# Patient Record
Sex: MALE | Race: BLACK OR AFRICAN AMERICAN | Employment: UNEMPLOYED | URBAN - METROPOLITAN AREA
[De-identification: names, ages, dates, MRNs, and addresses within clinical notes are randomized per-mention and may not be internally consistent; named-entity substitution may affect disease eponyms.]

---

## 2017-03-06 RX ORDER — LACOSAMIDE 100 MG/1
TABLET, FILM COATED ORAL
Qty: 120 TAB | Refills: 0 | Status: SHIPPED | OUTPATIENT
Start: 2017-03-06 | End: 2017-04-10 | Stop reason: SDUPTHER

## 2017-04-10 RX ORDER — LACOSAMIDE 100 MG/1
TABLET ORAL
Qty: 120 TAB | Refills: 6 | Status: SHIPPED | OUTPATIENT
Start: 2017-04-10 | End: 2017-04-11 | Stop reason: SDUPTHER

## 2017-04-10 RX ORDER — LEVETIRACETAM 500 MG/1
500 TABLET ORAL 2 TIMES DAILY
Qty: 60 TAB | Refills: 6 | Status: SHIPPED | OUTPATIENT
Start: 2017-04-10 | End: 2018-05-06 | Stop reason: SDUPTHER

## 2017-04-10 RX ORDER — DIVALPROEX SODIUM 500 MG/1
TABLET, EXTENDED RELEASE ORAL
Qty: 60 TAB | Refills: 6 | Status: SHIPPED | OUTPATIENT
Start: 2017-04-10 | End: 2017-11-03 | Stop reason: SDUPTHER

## 2017-04-11 ENCOUNTER — TELEPHONE (OUTPATIENT)
Dept: NEUROLOGY | Age: 32
End: 2017-04-11

## 2017-04-11 NOTE — TELEPHONE ENCOUNTER
----- Message from Luiza Zazueta sent at 4/11/2017 11:59 AM EDT -----  Regarding: Dr. Rios Arch P) [phone], pt's sister, was inquiring on the status of the Rx refill requested submitted last week.

## 2017-04-12 RX ORDER — LACOSAMIDE 100 MG/1
TABLET, FILM COATED ORAL
Qty: 120 TAB | Refills: 0 | Status: SHIPPED | OUTPATIENT
Start: 2017-04-12 | End: 2017-05-25 | Stop reason: SDUPTHER

## 2017-05-25 ENCOUNTER — OFFICE VISIT (OUTPATIENT)
Dept: NEUROLOGY | Age: 32
End: 2017-05-25

## 2017-05-25 VITALS
HEIGHT: 60 IN | SYSTOLIC BLOOD PRESSURE: 118 MMHG | BODY MASS INDEX: 25.91 KG/M2 | WEIGHT: 132 LBS | DIASTOLIC BLOOD PRESSURE: 70 MMHG | RESPIRATION RATE: 20 BRPM

## 2017-05-25 DIAGNOSIS — G40.109 PARTIAL EPILEPSY (HCC): Primary | ICD-10-CM

## 2017-05-25 RX ORDER — LACOSAMIDE 100 MG/1
TABLET ORAL
Qty: 120 TAB | Refills: 5 | Status: SHIPPED | OUTPATIENT
Start: 2017-05-25 | End: 2017-06-08 | Stop reason: SDUPTHER

## 2017-05-25 NOTE — PATIENT INSTRUCTIONS
[facility]   Statement to Patients  April 1, 2014      In an effort to ensure the large volume of patient prescription refills is processed in the most efficient and expeditious manner, we are asking our patients to assist us by calling your Pharmacy for all prescription refills, this will include also your  Mail Order Pharmacy. The pharmacy will contact our office electronically to continue the refill process. Please do not wait until the last minute to call your pharmacy. We need at least 48 hours (2days) to fill prescriptions. We also encourage you to call your pharmacy before going to  your prescription to make sure it is ready. With regard to controlled substance prescription refill requests (narcotic refills) that need to be picked up at our office, we ask your cooperation by providing us with at least 72 hours (3days) notice that you will need a refill. We will not refill narcotic prescription refill requests after 4:00pm on any weekday, Monday through Thursday, or after 2:00pm on Fridays, or on the weekends. We encourage everyone to explore another way of getting your prescription refill request processed using [employer] (formerly [employer]), our patient web portal through our electronic medical record system. [employer] (formerly [employer]) is an efficient and effective way to communicate your medication request directly to the office and  downloadable as an felicity on your smart phone . [employer] (formerly [employer]) also features a review functionality that allows you to view your medication list as well as leave messages for your physician. Are you ready to get connected? If so please review the attatched instructions or speak to any of our staff to get you set up right away! Thank you so much for your cooperation. Should you have any questions please contact our Practice Administrator.     The Physicians and Staff,  [facility] [id number] [address]  What is a living will?  A living will is a legal form you use to write down the kind of care you want at the end of your life. It is used by the health professionals who will treat you if you aren't able to decide for yourself. If you put your wishes in writing, your loved ones and others will know what kind of care you want. They won't need to guess. This can ease your mind and be helpful to others. A living will is not the same as an estate or property will. An estate will explains what you want to happen with your money and property after you die. Is a living will a legal document? A living will is a legal document. Each state has its own laws about living ribeiro. If you move to another state, make sure that your living will is legal in the state where you now live. Or you might use a universal form that has been approved by many states. This kind of form can sometimes be completed and stored online. Your electronic copy will then be available wherever you have a connection to the Internet. In most cases, doctors will respect your wishes even if you have a form from a different state. · You don't need an  to complete a living will. But legal advice can be helpful if your state's laws are unclear, your health history is complicated, or your family can't agree on what should be in your living will. · You can change your living will at any time. Some people find that their wishes about end-of-life care change as their health changes. · In addition to making a living will, think about completing a medical power of  form. This form lets you name the person you want to make end-of-life treatment decisions for you (your \"health care agent\") if you're not able to. Many hospitals and nursing homes will give you the forms you need to complete a living will and a medical power of . · Your living will is used only if you can't make or communicate decisions for yourself anymore.  If you become able to make decisions again, you can accept or refuse any treatment, no matter what you wrote in your living will. · Your state may offer an online registry. This is a place where you can store your living will online so the doctors and nurses who need to treat you can find it right away. What should you think about when creating a living will? Talk about your end-of-life wishes with your family members and your doctor. Let them know what you want. That way the people making decisions for you won't be surprised by your choices. Think about these questions as you make your living will:  · Do you know enough about life support methods that might be used? If not, talk to your doctor so you know what might be done if you can't breathe on your own, your heart stops, or you're unable to swallow. · What things would you still want to be able to do after you receive life-support methods? Would you want to be able to walk? To speak? To eat on your own? To live without the help of machines? · If you have a choice, where do you want to be cared for? In your home? At a hospital or nursing home? · Do you want certain Zoroastrianism practices performed if you become very ill? · If you have a choice at the end of your life, where would you prefer to die? At home? In a hospital or nursing home? Somewhere else? · Would you prefer to be buried or cremated? · Do you want your organs to be donated after you die? What should you do with your living will? · Make sure that your family members and your health care agent have copies of your living will. · Give your doctor a copy of your living will to keep in your medical record. If you have more than one doctor, make sure that each one has a copy. · You may want to put a copy of your living will where it can be easily found. Where can you learn more? Go to http://mayra-jonnie.info/. Enter S855 in the search box to learn more about \"Learning About Living Perkatt. \"  Current as of: February 24, 2016  Content Version: 11.2  © 0178-0558 [employer]. Care instructions adapted under license by [employer] (which disclaims liability or warranty for this information). If you have questions about a medical condition or this instruction, always ask your healthcare professional. [address] any warranty or liability for your use of this information. Advance Directives: Care Instructions  Your Care Instructions  An advance directive is a legal way to state your wishes at the end of your life. It tells your family and your doctor what to do if you can no longer say what you want. There are two main types of advance directives. You can change them any time that your wishes change. · A living will tells your family and your doctor your wishes about life support and other treatment. · A durable power of  for health care lets you name a person to make treatment decisions for you when you can't speak for yourself. This person is called a health care agent. If you do not have an advance directive, decisions about your medical care may be made by a doctor or a  who doesn't know you. It may help to think of an advance directive as a gift to the people who care for you. If you have one, they won't have to make tough decisions by themselves. Follow-up care is a key part of your treatment and safety. Be sure to make and go to all appointments, and call your doctor if you are having problems. It's also a good idea to know your test results and keep a list of the medicines you take. How can you care for yourself at home? · Discuss your wishes with your loved ones and your doctor. This way, there are no surprises. · Many states have a unique form. Or you might use a universal form that has been approved by many states. This kind of form can sometimes be completed and stored online.  Your electronic copy will then be available wherever you have a connection to the Internet. In most cases, doctors will respect your wishes even if you have a form from a different state. · You don't need a  to do an advance directive. But you may want to get legal advice. · Think about these questions when you prepare an advance directive:  ¨ Who do you want to make decisions about your medical care if you are not able to? Many people choose a family member or close friend. ¨ Do you know enough about life support methods that might be used? If not, talk to your doctor so you understand. ¨ What are you most afraid of that might happen? You might be afraid of having pain, losing your independence, or being kept alive by machines. ¨ Where would you prefer to die? Choices include your home, a hospital, or a nursing home. ¨ Would you like to have information about hospice care to support you and your family? ¨ Do you want to donate organs when you die? ¨ Do you want certain Hinduism practices performed before you die? If so, put your wishes in the advance directive. · Read your advance directive every year, and make changes as needed. When should you call for help? Be sure to contact your doctor if you have any questions. Where can you learn more? Go to http://mayra-jonnie.info/. Enter R264 in the search box to learn more about \"Advance Directives: Care Instructions. \"  Current as of: November 17, 2016  Content Version: 11.2  © 8924-0767 [employer]. Care instructions adapted under license by [employer] (which disclaims liability or warranty for this information). If you have questions about a medical condition or this instruction, always ask your healthcare professional. [address] any warranty or liability for your use of this information.

## 2017-05-25 NOTE — PROGRESS NOTES
Xavier Sanchez is a 27 y.o. male who presents with the following  Chief Complaint   Patient presents with    Seizure       HPI Patient comes in for a follow up for seizures. He is currently on Vimpat 200 mg BID, Depakote ER 1000 mg QHS, Keppra 500 mg BID. He has not noticed any seizures or seizure activity. Not gotten labs in a while. He feels like things have been going well. Living with his girlfriend. not noticed any seizure activity. Not a very good historian. Allergies   Allergen Reactions    Penicillins Rash       Current Outpatient Prescriptions   Medication Sig    lacosamide (VIMPAT) 100 mg tab tablet take 2 tablets by mouth twice a day    divalproex ER (DEPAKOTE ER) 500 mg ER tablet take 2 tablets by mouth at bedtime    levETIRAcetam (KEPPRA) 500 mg tablet Take 1 Tab by mouth two (2) times a day. Indications: MYOCLONIC EPILEPSY ADJUNCT TREATMENT     No current facility-administered medications for this visit. History   Smoking Status    Current Every Day Smoker    Packs/day: 1.00    Years: 10.00   Smokeless Tobacco    Never Used       Past Medical History:   Diagnosis Date    Neurological disorder     Seizures ([facility] [address]        No past surgical history on file. No family history on file. Social History     Social History    Marital status: SINGLE     Spouse name: N/A    Number of children: N/A    Years of education: N/A     Social History Main Topics    Smoking status: Current Every Day Smoker     Packs/day: 1.00     Years: 10.00    Smokeless tobacco: Never Used    Alcohol use Yes    Drug use: No    Sexual activity: Yes     Other Topics Concern    Not on file     Social History Narrative       Review of Systems   HENT: Negative for hearing loss and tinnitus. Eyes: Negative for blurred vision, double vision and photophobia. Gastrointestinal: Negative for nausea and vomiting.    Neurological: Negative for dizziness, tingling, tremors, seizures, loss of consciousness, weakness and headaches. Remainder of comprehensive review is negative. Physical Exam :    Visit Vitals    /70    Resp 20    Ht 5' (1.524 m)    Wt 59.9 kg (132 lb)    BMI 25.78 kg/m2       General: Well defined, nourished, and groomed individual in no acute distress.    Neck: Supple, nontender, no bruits, no pain with resistance to active range of motion.    Heart: Regular rate and rhythm, no murmurs, rub, or gallop. Normal S1S2. Lungs: Clear to auscultation bilaterally with equal chest expansion, no cough, no wheeze  Musculoskeletal: Extremities revealed no edema and had full range of motion of joints.    Psych: Good mood and bright affect    NEUROLOGICAL EXAMINATION:    Mental Status: Alert and oriented to person, place, and time    Cranial Nerves:    II, III, IV, VI: Visual acuity grossly intact. Visual fields are normal.    Pupils are equal, round, and reactive to light and accommodation.    Extra-ocular movements are full and fluid. Fundoscopic exam was benign, no ptosis or nystagmus.    V-XII: Hearing is grossly intact. Facial features are symmetric, with normal sensation and strength. The palate rises symmetrically and the tongue protrudes midline. Sternocleidomastoids 5/5. Motor Examination: Normal tone, bulk, and strength, 5/5 muscle strength throughout. Coordination: Finger to nose was normal. No resting or intention tremor    Gait and Station: Steady while walking. Normal arm swing. No pronator drift. No muscle wasting or fasiculations noted. Reflexes: DTRs 2+ throughout.             Results for orders placed or performed in visit on 04/09/15   VALPROIC ACID   Result Value Ref Range    Valproic acid 106 (H) 50 - 100 ug/mL   LACOSAMIDE   Result Value Ref Range    LACOSAMIDE 3.1 (L) 5.0 - 10.0 ug/mL   LEVETIRACETAM (KEPPRA)   Result Value Ref Range    LEVETIRACETAM, S 6.6 5.0 - 63.0 ug/mL       Orders Placed This Encounter    LACOSAMIDE    LEVETIRACETAM (KEPPRA)    VALPROIC ACID, FREE    HEPATIC FUNCTION PANEL    METABOLIC PANEL, COMPREHENSIVE    lacosamide (VIMPAT) 100 mg tab tablet     Sig: take 2 tablets by mouth twice a day     Dispense:  120 Tab     Refill:  5       1. Partial epilepsy ([address]        Follow-up Disposition:  Return in about 6 months (around 11/25/2017). Partial epilepsy seems to be stable on Vimpat, Depakote, and Keppra. Continue current doses. We will want to get blood levels of all these and also evaluate liver and kidney functioning. Call with any issues.          This note will not be viewable in [facility]

## 2017-05-25 NOTE — MR AVS SNAPSHOT
Visit Information Date & Time Provider Department Dept. Phone Encounter #  
 5/25/2017  3:00 PM Mahi Michael, [place] [address] [facility] [phone] [id number] Follow-up Instructions Return in about 6 months (around 11/25/2017). Upcoming Health Maintenance Date Due Pneumococcal 19-64 Medium Risk (1 of 1 - PPSV23) 12/19/2003 DTaP/Tdap/Td series (1 - Tdap) 12/19/2005 INFLUENZA AGE 9 TO ADULT 8/1/2017 Allergies as of 5/25/2017  Review Complete On: 5/25/2017 By: Cynthia Wilson LPN Severity Noted Reaction Type Reactions Penicillins Low 08/01/2010   Topical Rash Current Immunizations  Never Reviewed No immunizations on file. Not reviewed this visit You Were Diagnosed With   
  
 Codes Comments Partial epilepsy ([facility]    -  Primary ICD-10-CM: G40.109 ICD-9-CM: 345.50 Vitals BP Resp Height(growth percentile) Weight(growth percentile) BMI Smoking Status 118/70 20 5' (1.524 m) 132 lb (59.9 kg) 25.78 kg/m2 Current Every Day Smoker BMI and BSA Data Body Mass Index Body Surface Area 25.78 kg/m 2 1.59 m 2 Preferred Pharmacy Pharmacy Name Phone [id number] [address] [phone] Your Updated Medication List  
  
   
This list is accurate as of: 5/25/17  3:26 PM.  Always use your most recent med list.  
  
  
  
  
 divalproex  mg ER tablet Commonly known as:  DEPAKOTE ER  
take 2 tablets by mouth at bedtime  
  
 lacosamide 100 mg Tab tablet Commonly known as:  VIMPAT  
take 2 tablets by mouth twice a day  
  
 levETIRAcetam 500 mg tablet Commonly known as:  KEPPRA Take 1 Tab by mouth two (2) times a day. Indications: MYOCLONIC EPILEPSY ADJUNCT TREATMENT Prescriptions Printed Refills  
 lacosamide (VIMPAT) 100 mg tab tablet 5 Sig: take 2 tablets by mouth twice a day Class: Print We Performed the Following HEPATIC FUNCTION PANEL [99985 CPT(R)] LACOSAMIDE [94110 CPT(R)] LEVETIRACETAM (KEPPRA) [id number] CPT(R)] METABOLIC PANEL, COMPREHENSIVE [31643 CPT(R)] VALPROIC ACID, FREE [93141 CPT(R)] Follow-up Instructions Return in about 6 months (around 11/25/2017). Patient Instructions PRESCRIPTION REFILL POLICY [facility] Statement to Patients April 1, 2014 In an effort to ensure the large volume of patient prescription refills is processed in the most efficient and expeditious manner, we are asking our patients to assist us by calling your Pharmacy for all prescription refills, this will include also your  Mail Order Pharmacy. The pharmacy will contact our office electronically to continue the refill process. Please do not wait until the last minute to call your pharmacy. We need at least 48 hours (2days) to fill prescriptions. We also encourage you to call your pharmacy before going to  your prescription to make sure it is ready. With regard to controlled substance prescription refill requests (narcotic refills) that need to be picked up at our office, we ask your cooperation by providing us with at least 72 hours (3days) notice that you will need a refill. We will not refill narcotic prescription refill requests after 4:00pm on any weekday, Monday through Thursday, or after 2:00pm on Fridays, or on the weekends. We encourage everyone to explore another way of getting your prescription refill request processed using CodeEval, our patient web portal through our electronic medical record system. CodeEval is an efficient and effective way to communicate your medication request directly to the office and  downloadable as an felicity on your smart phone .  CodeEval also features a review functionality that allows you to view your medication list as well as leave messages for your physician. Are you ready to get connected? If so please review the attatched instructions or speak to any of our staff to get you set up right away! Thank you so much for your cooperation. Should you have any questions please contact our Practice Administrator. The Physicians and Staff,  [facility] [address]igi [address] What is a living will? A living will is a legal form you use to write down the kind of care you want at the end of your life. It is used by the health professionals who will treat you if you aren't able to decide for yourself. If you put your wishes in writing, your loved ones and others will know what kind of care you want. They won't need to guess. This can ease your mind and be helpful to others. A living will is not the same as an estate or property will. An estate will explains what you want to happen with your money and property after you die. Is a living will a legal document? A living will is a legal document. Each state has its own laws about living ribeiro. If you move to another state, make sure that your living will is legal in the state where you now live. Or you might use a universal form that has been approved by many states. This kind of form can sometimes be completed and stored online. Your electronic copy will then be available wherever you have a connection to the Internet. In most cases, doctors will respect your wishes even if you have a form from a different state. · You don't need an  to complete a living will. But legal advice can be helpful if your state's laws are unclear, your health history is complicated, or your family can't agree on what should be in your living will. · You can change your living will at any time. Some people find that their wishes about end-of-life care change as their health changes.  
· In addition to making a living will, think about completing a medical power of  form. This form lets you name the person you want to make end-of-life treatment decisions for you (your \"health care agent\") if you're not able to. Many hospitals and nursing homes will give you the forms you need to complete a living will and a medical power of . · Your living will is used only if you can't make or communicate decisions for yourself anymore. If you become able to make decisions again, you can accept or refuse any treatment, no matter what you wrote in your living will. · Your state may offer an online registry. This is a place where you can store your living will online so the doctors and nurses who need to treat you can find it right away. What should you think about when creating a living will? Talk about your end-of-life wishes with your family members and your doctor. Let them know what you want. That way the people making decisions for you won't be surprised by your choices. Think about these questions as you make your living will: · Do you know enough about life support methods that might be used? If not, talk to your doctor so you know what might be done if you can't breathe on your own, your heart stops, or you're unable to swallow. · What things would you still want to be able to do after you receive life-support methods? Would you want to be able to walk? To speak? To eat on your own? To live without the help of machines? · If you have a choice, where do you want to be cared for? In your home? At a hospital or nursing home? · Do you want certain Caodaism practices performed if you become very ill? · If you have a choice at the end of your life, where would you prefer to die? At home? In a hospital or nursing home? Somewhere else? · Would you prefer to be buried or cremated? · Do you want your organs to be donated after you die? What should you do with your living will?  
· Make sure that your family members and your health care agent have copies of your living will. · Give your doctor a copy of your living will to keep in your medical record. If you have more than one doctor, make sure that each one has a copy. · You may want to put a copy of your living will where it can be easily found. Where can you learn more? Go to http://mayra-jonnie.info/. Enter D472 in the search box to learn more about \"Learning About Living Lisa. \" Current as of: February 24, 2016 Content Version: 11.2 © 0651-1335 [employer]. Care instructions adapted under license by [employer] (which disclaims liability or warranty for this information). If you have questions about a medical condition or this instruction, always ask your healthcare professional. [address] any warranty or liability for your use of this information. Advance Directives: Care Instructions Your Care Instructions An advance directive is a legal way to state your wishes at the end of your life. It tells your family and your doctor what to do if you can no longer say what you want. There are two main types of advance directives. You can change them any time that your wishes change. · A living will tells your family and your doctor your wishes about life support and other treatment. · A durable power of  for health care lets you name a person to make treatment decisions for you when you can't speak for yourself. This person is called a health care agent. If you do not have an advance directive, decisions about your medical care may be made by a doctor or a  who doesn't know you. It may help to think of an advance directive as a gift to the people who care for you. If you have one, they won't have to make tough decisions by themselves. Follow-up care is a key part of your treatment and safety.  Be sure to make and go to all appointments, and call your doctor if you are having problems. It's also a good idea to know your test results and keep a list of the medicines you take. How can you care for yourself at home? · Discuss your wishes with your loved ones and your doctor. This way, there are no surprises. · Many states have a unique form. Or you might use a universal form that has been approved by many states. This kind of form can sometimes be completed and stored online. Your electronic copy will then be available wherever you have a connection to the Internet. In most cases, doctors will respect your wishes even if you have a form from a different state. · You don't need a  to do an advance directive. But you may want to get legal advice. · Think about these questions when you prepare an advance directive: ¨ Who do you want to make decisions about your medical care if you are not able to? Many people choose a family member or close friend. ¨ Do you know enough about life support methods that might be used? If not, talk to your doctor so you understand. ¨ What are you most afraid of that might happen? You might be afraid of having pain, losing your independence, or being kept alive by machines. ¨ Where would you prefer to die? Choices include your home, a hospital, or a nursing home. ¨ Would you like to have information about hospice care to support you and your family? ¨ Do you want to donate organs when you die? ¨ Do you want certain Yazidism practices performed before you die? If so, put your wishes in the advance directive. · Read your advance directive every year, and make changes as needed. When should you call for help? Be sure to contact your doctor if you have any questions. Where can you learn more? Go to http://mayra-jonnie.info/. Enter R264 in the search box to learn more about \"Advance Directives: Care Instructions. \" Current as of: November 17, 2016 Content Version: 11.2 © 4424-9701 [employer]. Care instructions adapted under license by [employer] (which disclaims liability or warranty for this information). If you have questions about a medical condition or this instruction, always ask your healthcare professional. [address]  any warranty or liability for your use of this information. Introducing [facility]! Deion Allan introduces Adknowledge patient portal. Now you can access parts of your medical record, email your doctor's office, and request medication refills online. 1. In your internet browser, go to [email or web address] 2. Click on the First Time User? Click Here link in the Sign In box. You will see the New Member Sign Up page. 3. Enter your Adknowledge Access Code exactly as it appears below. You will not need to use this code after youve completed the sign-up process. If you do not sign up before the expiration date, you must request a new code. · Adknowledge Access Code: [email or web address] Expires: 8/23/2017  3:26 PM 
 
4. Enter the last four digits of your Social Security Number (xxxx) and Date of Birth (mm/dd/yyyy) as indicated and click Submit. You will be taken to the next sign-up page. 5. Create a Adknowledge ID. This will be your Adknowledge login ID and cannot be changed, so think of one that is secure and easy to remember. 6. Create a Adknowledge password. You can change your password at any time. 7. Enter your Password Reset Question and Answer. This can be used at a later time if you forget your password. 8. Enter your e-mail address. You will receive e-mail notification when new information is available in [address] 9. Click Sign Up. You can now view and download portions of your medical record. 10. Click the Download Summary menu link to download a portable copy of your medical information.  
 
If you have questions, please visit the Frequently Asked Questions section of the [employer]. Remember, [employer] is NOT to be used for urgent needs. For medical emergencies, dial 911. Now available from your iPhone and Android! Please provide this summary of care documentation to your next provider. Your primary care clinician is listed as NILESH FREY. If you have any questions after today's visit, please call [phone].

## 2017-06-13 ENCOUNTER — ED HISTORICAL/CONVERTED ENCOUNTER (OUTPATIENT)
Dept: OTHER | Age: 32
End: 2017-06-13

## 2017-11-03 RX ORDER — DIVALPROEX SODIUM 500 MG/1
TABLET, EXTENDED RELEASE ORAL
Qty: 60 TAB | Refills: 6 | Status: SHIPPED | OUTPATIENT
Start: 2017-11-03 | End: 2019-04-18

## 2018-02-26 ENCOUNTER — APPOINTMENT (OUTPATIENT)
Dept: CT IMAGING | Age: 33
End: 2018-02-26
Attending: EMERGENCY MEDICINE
Payer: MEDICARE

## 2018-02-26 ENCOUNTER — HOSPITAL ENCOUNTER (EMERGENCY)
Age: 33
Discharge: HOME | End: 2018-02-26
Attending: EMERGENCY MEDICINE
Payer: MEDICARE

## 2018-02-26 VITALS
BODY MASS INDEX: 25 KG/M2 | RESPIRATION RATE: 16 BRPM | HEART RATE: 91 BPM | TEMPERATURE: 98.2 F | SYSTOLIC BLOOD PRESSURE: 125 MMHG | DIASTOLIC BLOOD PRESSURE: 56 MMHG | OXYGEN SATURATION: 98 % | WEIGHT: 128 LBS

## 2018-02-26 DIAGNOSIS — G40.909 NONINTRACTABLE EPILEPSY WITHOUT STATUS EPILEPTICUS, UNSPECIFIED EPILEPSY TYPE (HCC): Primary | ICD-10-CM

## 2018-02-26 LAB
BASOPHILS # BLD: 0 K/UL (ref 0–0.1)
BASOPHILS NFR BLD: 1 % (ref 0–1)
DIFFERENTIAL METHOD BLD: ABNORMAL
EOSINOPHIL # BLD: 0.2 K/UL (ref 0–0.4)
EOSINOPHIL NFR BLD: 5 % (ref 0–7)
ERYTHROCYTE [DISTWIDTH] IN BLOOD BY AUTOMATED COUNT: 12.5 % (ref 11.5–14.5)
HCT VFR BLD AUTO: 41.2 % (ref 36.6–50.3)
HGB BLD-MCNC: 14.4 G/DL (ref 12.1–17)
IMM GRANULOCYTES # BLD: 0 K/UL (ref 0–0.04)
IMM GRANULOCYTES NFR BLD AUTO: 0 % (ref 0–0.5)
LYMPHOCYTES # BLD: 1.7 K/UL (ref 0.8–3.5)
LYMPHOCYTES NFR BLD: 34 % (ref 12–49)
MAGNESIUM SERPL-MCNC: 1.7 MG/DL (ref 1.6–2.4)
MCH RBC QN AUTO: 30.9 PG (ref 26–34)
MCHC RBC AUTO-ENTMCNC: 35 G/DL (ref 30–36.5)
MCV RBC AUTO: 88.4 FL (ref 80–99)
MONOCYTES # BLD: 0.8 K/UL (ref 0–1)
MONOCYTES NFR BLD: 16 % (ref 5–13)
NEUTS SEG # BLD: 2.2 K/UL (ref 1.8–8)
NEUTS SEG NFR BLD: 45 % (ref 32–75)
NRBC # BLD: 0 K/UL (ref 0–0.01)
NRBC BLD-RTO: 0 PER 100 WBC
PLATELET # BLD AUTO: 151 K/UL (ref 150–400)
PMV BLD AUTO: 11.5 FL (ref 8.9–12.9)
RBC # BLD AUTO: 4.66 M/UL (ref 4.1–5.7)
VALPROATE SERPL-MCNC: 67 UG/ML (ref 50–100)
WBC # BLD AUTO: 4.8 K/UL (ref 4.1–11.1)

## 2018-02-26 PROCEDURE — 99284 EMERGENCY DEPT VISIT MOD MDM: CPT

## 2018-02-26 PROCEDURE — 80164 ASSAY DIPROPYLACETIC ACD TOT: CPT | Performed by: EMERGENCY MEDICINE

## 2018-02-26 PROCEDURE — 85025 COMPLETE CBC W/AUTO DIFF WBC: CPT | Performed by: EMERGENCY MEDICINE

## 2018-02-26 PROCEDURE — 80177 DRUG SCRN QUAN LEVETIRACETAM: CPT | Performed by: EMERGENCY MEDICINE

## 2018-02-26 PROCEDURE — 36415 COLL VENOUS BLD VENIPUNCTURE: CPT | Performed by: EMERGENCY MEDICINE

## 2018-02-26 PROCEDURE — 70450 CT HEAD/BRAIN W/O DYE: CPT

## 2018-02-26 PROCEDURE — 83735 ASSAY OF MAGNESIUM: CPT | Performed by: EMERGENCY MEDICINE

## 2018-02-27 NOTE — ED NOTES
Patient presented to the ED today for complaints of seizures. Patient says symptoms started tonight. Patient says a family member witnessed him having a seizure and falling. Patient does not remember having the seizure. Respirations are even and unlabored.

## 2018-02-27 NOTE — ED NOTES
Patient educated on discharge instructions  . Patient verbalized understanding of eduction. Patient given discharge instructions . Patient ambulated out of ED with his mother and sister. No acute distress noted. Emergency Department Nursing Plan of Care       The Nursing Plan of Care is developed from the Nursing assessment and Emergency Department Attending provider initial evaluation. The plan of care may be reviewed in the ED Provider note.     The Plan of Care was developed with the following considerations:   Patient / Family readiness to learn indicated by:verbalized understanding  Persons(s) to be included in education: patient  Barriers to Learning/Limitations:No    Signed     Jacob Alvarez RN    2/26/2018   11:41 PM

## 2018-02-27 NOTE — ED PROVIDER NOTES
EMERGENCY DEPARTMENT HISTORY AND PHYSICAL EXAM      Date: 2/26/2018  Patient Name: Tammy Boykin    History of Presenting Illness     Chief Complaint   Patient presents with    Seizure     Patient says he had a seizure tonight. Patient says he fell during the seizure. History Provided By: Patient and EMS    HPI: Tammy Boykin, 34 y.o. male with PMHx significant for seizures who presents via EMS to the ED s/p seizures that occurred PTA. Pt reports associated head injury that occurred during his seizure. Pt reports that he is compliant with his rx for Depakote and Keppra. He denies any recent EtOH use. Pt specifically denies any confusion, HA, nausea, vomiting, CP, SOB, vision changes, fevers, or chills. + tobacco use (1 ppd), + EtOH use, - Illicit drug use    PCP: Dain Cunningham MD    There are no other complaints, changes, or physical findings at this time. Current Outpatient Prescriptions   Medication Sig Dispense Refill    VIMPAT 100 mg tab tablet take 2 tablets by mouth twice a day 120 Tab 0    divalproex ER (DEPAKOTE ER) 500 mg ER tablet take 2 tablets by mouth at bedtime 60 Tab 6    levETIRAcetam (KEPPRA) 500 mg tablet Take 1 Tab by mouth two (2) times a day. Indications: MYOCLONIC EPILEPSY ADJUNCT TREATMENT 60 Tab 6       Past History     Past Medical History:  Past Medical History:   Diagnosis Date    Neurological disorder     Seizures ([address]        Past Surgical History:  History reviewed. No pertinent surgical history. Family History:  History reviewed. No pertinent family history. Social History:  Social History   Substance Use Topics    Smoking status: Current Every Day Smoker     Packs/day: 1.00     Years: 10.00    Smokeless tobacco: Never Used    Alcohol use Yes       Allergies: Allergies   Allergen Reactions    Penicillins Rash         Review of Systems   Review of Systems   Constitutional: Negative for chills and fever.    HENT: Negative for congestion, rhinorrhea, sneezing and sore throat. Eyes: Negative for redness and visual disturbance. Respiratory: Negative for shortness of breath. Cardiovascular: Negative for chest pain and leg swelling. Gastrointestinal: Negative for abdominal pain, nausea and vomiting. Genitourinary: Negative for difficulty urinating and frequency. Musculoskeletal: Negative for back pain, myalgias and neck stiffness. Skin: Negative for rash. Neurological: Positive for seizures. Negative for dizziness, syncope, weakness and headaches.        + head injury   Hematological: Negative for adenopathy. Psychiatric/Behavioral: Negative for confusion. All other systems reviewed and are negative. Physical Exam   Physical Exam   Constitutional: He is oriented to person, place, and time. He appears well-developed and well-nourished. No distress. NAD. Cooperative. HENT:   Head: Normocephalic and atraumatic. Mouth/Throat: Oropharynx is clear and moist. No oropharyngeal exudate or posterior oropharyngeal erythema. Neck: Normal range of motion and full passive range of motion without pain. Neck supple. Cardiovascular: Normal rate, regular rhythm, normal heart sounds, intact distal pulses and normal pulses. Exam reveals no gallop and no friction rub. No murmur heard. Pulmonary/Chest: Effort normal and breath sounds normal. No accessory muscle usage. No respiratory distress. He has no decreased breath sounds. He has no wheezes. He has no rhonchi. He has no rales. Abdominal: Soft. Bowel sounds are normal. He exhibits no distension. There is no tenderness. There is no rebound, no guarding and no CVA tenderness. Musculoskeletal: Normal range of motion. He exhibits no edema or tenderness. Thoracic back: He exhibits no tenderness and no bony tenderness. Lumbar back: He exhibits no tenderness and no bony tenderness. Lymphadenopathy:     He has no cervical adenopathy.    Neurological: He is alert and oriented to person, place, and time. He has normal strength. He is not disoriented. No cranial nerve deficit or sensory deficit. No focal deficits; 5/5 muscle strength in all extremities. ANO x 3. After post-ictal period. Skin: Skin is warm. No lesion and no rash noted. Rash is not nodular. He is not diaphoretic. Cap refill < 2 seconds. Contusion with skin breakdown to the left occipital scalp. No other lesions. Nursing note and vitals reviewed. Diagnostic Study Results     Labs -     Recent Results (from the past 12 hour(s))   VALPROIC ACID    Collection Time: 02/26/18 10:34 PM   Result Value Ref Range    Valproic acid 67 50 - 100 ug/ml   CBC WITH AUTOMATED DIFF    Collection Time: 02/26/18 10:34 PM   Result Value Ref Range    WBC 4.8 4.1 - 11.1 K/uL    RBC 4.66 4.10 - 5.70 M/uL    HGB 14.4 12.1 - 17.0 g/dL    HCT 41.2 36.6 - 50.3 %    MCV 88.4 80.0 - 99.0 FL    MCH 30.9 26.0 - 34.0 PG    MCHC 35.0 30.0 - 36.5 g/dL    RDW 12.5 11.5 - 14.5 %    PLATELET 460 149 - 228 K/uL    MPV 11.5 8.9 - 12.9 FL    NRBC 0.0 0  WBC    ABSOLUTE NRBC 0.00 0.00 - 0.01 K/uL    NEUTROPHILS 45 32 - 75 %    LYMPHOCYTES 34 12 - 49 %    MONOCYTES 16 (H) 5 - 13 %    EOSINOPHILS 5 0 - 7 %    BASOPHILS 1 0 - 1 %    IMMATURE GRANULOCYTES 0 0.0 - 0.5 %    ABS. NEUTROPHILS 2.2 1.8 - 8.0 K/UL    ABS. LYMPHOCYTES 1.7 0.8 - 3.5 K/UL    ABS. MONOCYTES 0.8 0.0 - 1.0 K/UL    ABS. EOSINOPHILS 0.2 0.0 - 0.4 K/UL    ABS. BASOPHILS 0.0 0.0 - 0.1 K/UL    ABS. IMM. GRANS. 0.0 0.00 - 0.04 K/UL    DF AUTOMATED     MAGNESIUM    Collection Time: 02/26/18 10:34 PM   Result Value Ref Range    Magnesium 1.7 1.6 - 2.4 mg/dL       Medical Decision Making   I am the first provider for this patient. I reviewed the vital signs, available nursing notes, past medical history, past surgical history, family history and social history. Vital Signs-Reviewed the patient's vital signs.   Patient Vitals for the past 12 hrs:   Temp Pulse Resp BP SpO2   02/26/18 2209 98.2 °F (36.8 °C) 91 16 125/56 98 %       Records Reviewed: Nursing Notes and Old Medical Records    Provider Notes (Medical Decision Making):     Breakthrough epilepsy with medication compliance. ED Course:   Initial assessment performed. The patients presenting problems have been discussed, and they are in agreement with the care plan formulated and outlined with them. I have encouraged them to ask questions as they arise throughout their visit. PROGRESS NOTE:  11:31 PM  Pt updated on CT and lab results. Discussed with the pt that the results of his keppra levels will not be back today and the need to follow up with neurology. Written by Cara Yoo ED scribe, as dictated by Miryam Torres MD    Disposition:    DISCHARGE NOTE  11:31 PM  The patient has been re-evaluated and is ready for discharge. Reviewed available results with patient. Counseled patient on diagnosis and care plan. Patient has expressed understanding, and all questions have been answered. Patient agrees with plan and agrees to follow up as recommended, or return to the ED if their symptoms worsen. Discharge instructions have been provided and explained to the patient, along with reasons to return to the ED. PLAN:  1. Current Discharge Medication List        2. Follow-up Information     Follow up With Details Comments Contact Info    Dain Cunningham MD   Patient can only remember the practice name and not the physician          Return to ED if worse     Diagnosis     Clinical Impression:   1. Nonintractable epilepsy without status epilepticus, unspecified epilepsy type [facility])        Attestations: This note is prepared by Cara Yoo, acting as Scribe for Miryam Torres MD.    Miryam Torres MD: The scribe's documentation has been prepared under my direction and personally reviewed by me in its entirety.  I confirm that the note above accurately reflects all work, treatment, procedures, and medical decision making performed by me.

## 2018-02-28 LAB — LEVETIRACETAM SERPL-MCNC: 3.4 UG/ML (ref 10–40)

## 2018-06-26 ENCOUNTER — TELEPHONE (OUTPATIENT)
Dept: NEUROLOGY | Age: 33
End: 2018-06-26

## 2018-06-26 NOTE — TELEPHONE ENCOUNTER
----- Message from Familia Morrison sent at 6/26/2018  1:59 PM EDT -----  Regarding: Dr. Dennis Pan  The patient is requesting a call back from the doctor to discuss his insurance coverage.  (u)[phone]

## 2018-07-09 ENCOUNTER — OFFICE VISIT (OUTPATIENT)
Dept: NEUROLOGY | Age: 33
End: 2018-07-09

## 2018-07-09 VITALS
RESPIRATION RATE: 98 BRPM | SYSTOLIC BLOOD PRESSURE: 122 MMHG | DIASTOLIC BLOOD PRESSURE: 86 MMHG | BODY MASS INDEX: 27.09 KG/M2 | HEART RATE: 69 BPM | HEIGHT: 60 IN | WEIGHT: 138 LBS

## 2018-07-09 DIAGNOSIS — G40.209 LOCALIZATION-RELATED (FOCAL) (PARTIAL) SYMPTOMATIC EPILEPSY AND EPILEPTIC SYNDROMES WITH COMPLEX PARTIAL SEIZURES, NOT INTRACTABLE, WITHOUT STATUS EPILEPTICUS (HCC): Primary | ICD-10-CM

## 2018-07-09 RX ORDER — LEVETIRACETAM 500 MG/1
1000 TABLET ORAL 2 TIMES DAILY
Qty: 120 TAB | Refills: 3 | Status: SHIPPED | OUTPATIENT
Start: 2018-07-09 | End: 2018-11-28 | Stop reason: SDUPTHER

## 2018-07-09 NOTE — PROGRESS NOTES
Follow up for seizure. He is accompanied by his nephew that lives with him. He says he cant remember when his last sz was but nephew thinks it was around June 16. He is currently only taking keppra 500 mg BID. They think he ran out of vimpat and divalproex in May.

## 2018-07-09 NOTE — MR AVS SNAPSHOT
[address] [facility] 
 
 
 [employer] [address] [id number] [phone] Patient: Sweta Leo MRN: [id number] :1985 Visit Information Date & Time Provider Department Dept. Phone Encounter #  
 2018  1:00 PM Marlen Alvarez NP [facility] Neurology [facility] [phone] [id number] Follow-up Instructions Return in about 8 weeks (around 9/3/2018). Your Appointments 2018 10:00 AM  
PROCEDURE with EEG SCHEDULE  [facility] ([facility]) Appt Note: [address] [facility],[address]  
 [facility] [address] [address] [phone]  
  
   
 [facility] [address] [id number] [address] Upcoming Health Maintenance Date Due Pneumococcal 19-64 Medium Risk (1 of 1 - PPSV23) 2003 DTaP/Tdap/Td series (1 - Tdap) 2005 Influenza Age 5 to Adult 2018 Allergies as of 2018  Review Complete On: 2018 By: Marlen Alvarez NP Severity Noted Reaction Type Reactions Penicillins Low 2010   Topical Rash Current Immunizations  Never Reviewed No immunizations on file. Not reviewed this visit You Were Diagnosed With   
  
 Codes Comments Localization-related (focal) (partial) symptomatic epilepsy and epileptic syndromes with complex partial seizures, not intractable, without status epilepticus ([facility]    -  Primary ICD-10-CM: V24.277 ICD-9-CM: 345.40 Vitals BP Pulse Resp Height(growth percentile) Weight(growth percentile) BMI  
 122/86 69 (!) 98 5' (1.524 m) 138 lb (62.6 kg) 26.95 kg/m2 Smoking Status Current Every Day Smoker BMI and BSA Data Body Mass Index Body Surface Area  
 26.95 kg/m 2 1.63 m 2 Preferred Pharmacy Pharmacy Name Phone [id number] & [address] [phone] Your Updated Medication List  
  
   
This list is accurate as of 7/9/18  2:17 PM.  Always use your most recent med list.  
  
  
  
  
 divalproex  mg ER tablet Commonly known as:  DEPAKOTE ER  
take 2 tablets by mouth at bedtime  
  
 levETIRAcetam 500 mg tablet Commonly known as:  KEPPRA Take 2 Tabs by mouth two (2) times a day. VIMPAT 100 mg Tab tablet Generic drug:  lacosamide  
take 2 tablets by mouth twice a day Prescriptions Sent to Pharmacy Refills  
 levETIRAcetam (KEPPRA) 500 mg tablet 3 Sig: Take 2 Tabs by mouth two (2) times a day. Class: Normal  
 Pharmacy: [facility] Jessica Smith, [facility] [facility] Ph #: [phone] Route: Oral  
  
Follow-up Instructions Return in about 8 weeks (around 9/3/2018). To-Do List   
 07/09/2018 Neurology:  EEG AMB NEURO   
  
 07/09/2018 Imaging:  MRI BRAIN W WO CONT Patient Instructions Increase Keppra to 1000mg twice a day. This would be two tablets twice a day. Introducing [facility]! [facility] introduces MaidSafe patient portal. Now you can access parts of your medical record, email your doctor's office, and request medication refills online. 1. In your internet browser, go to [email or web address] 2. Click on the First Time User? Click Here link in the Sign In box. You will see the New Member Sign Up page. 3. Enter your MaidSafe Access Code exactly as it appears below. You will not need to use this code after youve completed the sign-up process. If you do not sign up before the expiration date, you must request a new code. · MaidSafe Access Code: [email or web address] Expires: 10/7/2018  2:11 PM 
 
4. Enter the last four digits of your Social Security Number (xxxx) and Date of Birth (mm/dd/yyyy) as indicated and click Submit. You will be taken to the next sign-up page. 5. Create a TRAFI ID. This will be your TRAFI login ID and cannot be changed, so think of one that is secure and easy to remember. 6. Create a TRAFI password. You can change your password at any time. 7. Enter your Password Reset Question and Answer. This can be used at a later time if you forget your password. 8. Enter your e-mail address. You will receive e-mail notification when new information is available in [address] 9. Click Sign Up. You can now view and download portions of your medical record. 10. Click the Download Summary menu link to download a portable copy of your medical information. If you have questions, please visit the Frequently Asked Questions section of the TRAFI website. Remember, TRAFI is NOT to be used for urgent needs. For medical emergencies, dial 911. Now available from your iPhone and Android! Please provide this summary of care documentation to your next provider. Your primary care clinician is listed as Phys Other. If you have any questions after today's visit, please call [phone].

## 2018-07-09 NOTE — PROGRESS NOTES
Date:  18     Name:  Jennett Dance  :  1985  MRN:  [id number]     PCP:  Leonidas Morrissey MD    Chief Complaint   Patient presents with    Seizure     HISTORY OF PRESENT ILLNESS: Follow up for epilepsy. He had a seizure in February and per his nephew the last seizure was in . He ran out of the Vimpat sometime in December and the Depakote sometime in May. At this point, the only seizure medication he has been on consistently has been the [address]. He cannot tell me if he has ever been on monotherapy. Unknown when he may have had an EEG but years before his initial visit with this practice in . No other imaging aside from head CT which has been unrevealing. He is a poor historian and his nephew is really not up to date on the history outside of what he has seen since they have been living together. Except as noted above, denies  fever, chills, cough. No CP or SOB. No dysuria, loss of bowel or bladder control. No Weight loss. Appetite good. Sleeping well. No sweats. No edema. No bruising or bleeding. No nausea or vomit. No diarrhea. No frequency, urgency, No depressive sxs. No anxiety. Denies sore throat, nasal congestion, nasal discharge, epistaxis, tinnitus, hearing loss, back pain, muscle pain, or joint pain. Current Outpatient Prescriptions   Medication Sig    levETIRAcetam (KEPPRA) 500 mg tablet take 1 tablet by mouth twice a day    VIMPAT 100 mg tab tablet take 2 tablets by mouth twice a day    divalproex ER (DEPAKOTE ER) 500 mg ER tablet take 2 tablets by mouth at bedtime     No current facility-administered medications for this visit. Allergies   Allergen Reactions    Penicillins Rash     Past Medical History:   Diagnosis Date    Neurological disorder     Seizures ([facility]      History reviewed. No pertinent surgical history.   Social History     Social History    Marital status: SINGLE     Spouse name: N/A    Number of children: N/A    Years of education: N/A     Occupational History    Not on file. Social History Main Topics    Smoking status: Current Every Day Smoker     Packs/day: 1.00     Years: 10.00    Smokeless tobacco: Never Used    Alcohol use Yes    Drug use: No    Sexual activity: Yes     Other Topics Concern    Not on file     Social History Narrative     History reviewed. No pertinent family history. PHYSICAL EXAMINATION:    Visit Vitals    /86    Pulse 69    Resp (!) 98    Ht 5' (1.524 m)    Wt 62.6 kg (138 lb)    BMI 26.95 kg/m2     General:  Well defined, nourished, and groomed individual in no acute distress. Neck: Supple, nontender, no bruits, no pain with resistance to active range of motion. Heart: Regular rate and rhythm, no murmurs, rub, or gallop. Normal S1S2. Lungs:  Clear to auscultation bilaterally with equal chest expansion, no cough, no wheeze  Musculoskeletal:  Extremities revealed no edema and had full range of motion of joints. Psych:  Good mood and bright affect    NEUROLOGICAL EXAMINATION:     Mental Status:   Alert and oriented to person, place, and time. Attention span and concentration are normal. Speech is fluent with a full fund of knowledge. He is somewhat simplistic    Cranial Nerves:    II, III, IV, VI:  Visual acuity grossly intact. Visual fields are normal.    Pupils are equal, round, and reactive to light and accommodation. Extra-ocular movements are full and fluid. Fundoscopic exam was benign, no ptosis or nystagmus. V-XII: Hearing is grossly intact. Facial features are symmetric, with normal sensation and strength. The palate rises symmetrically and the tongue protrudes midline. Sternocleidomastoids 5/5. Motor Examination: Normal tone, bulk, and strength, 5/5 muscle strength throughout. Coordination:  Finger to nose was normal.   No resting or intention tremor    Gait and Station:  Steady while walking. Normal arm swing. No pronator drift.    No muscle wasting or fasiculations noted. Reflexes:  DTRs 2+ throughout. ASSESSMENT AND PLAN    ICD-10-CM ICD-9-CM    1. Localization-related (focal) (partial) symptomatic epilepsy and epileptic syndromes with complex partial seizures, not intractable, without status epilepticus ([facility] G40.209 345.40 levETIRAcetam (KEPPRA) 500 mg tablet      EEG AMB NEURO      MRI BRAIN W WO CONT     Unfortunately, it is unclear how Mr. Kit Houser ended up on three AEDs. It is noted that he ran out of the Vimpat about six months ago and the Depakote over a month ago with only two reported seizures, one in February and one in June. At this point, I would like to hold off on adding back the Depakote ER and Vimpat. Instead, he was instructed to increase the Keppra to 1000mg twice a day. He will be sent for a new baseline EEG as well as an MRI of the brain to assess for any potential structural focus for the seizures. Follow up in two months or sooner pending his test results     [address].  Kayli Beck

## 2018-07-18 ENCOUNTER — TELEPHONE (OUTPATIENT)
Dept: NEUROLOGY | Age: 33
End: 2018-07-18

## 2018-07-31 ENCOUNTER — OFFICE VISIT (OUTPATIENT)
Dept: NEUROLOGY | Age: 33
End: 2018-07-31

## 2018-07-31 DIAGNOSIS — G40.209 LOCALIZATION-RELATED (FOCAL) (PARTIAL) SYMPTOMATIC EPILEPSY AND EPILEPTIC SYNDROMES WITH COMPLEX PARTIAL SEIZURES, NOT INTRACTABLE, WITHOUT STATUS EPILEPTICUS (HCC): ICD-10-CM

## 2018-07-31 NOTE — PROGRESS NOTES
This was an elective routine EEG for evaluation of breakthrough seizure activity. Last seizure in June and before that in February of this year. Routine scalp leads were applied according to the 10-20 International system. EKG monitor as well. The background rhythm during the more awake portions was 8+ Hz.  Reasonable modulation of background activity with eye opening and closure. No evidence of focal slowing or spontaneous electrocerebral discharges. In the beginning of the record there is inferred drowsiness as there is uniform dampening of background activity. This transitions into awake activity which is pretty much the case until the EEG is finished. Hyperventilation was poorly performed according to technician. No changes were noteworthy. Photic stim produced reasonable photic drive at different harmonics. Technician took note of patient movement at one point, but no other untoward activity on the patient's part as it applied to movements behavior mannerisms or vocalizations. EKG grossly sinus rhythm. Impression: This is a normal largely awake partial drowsy recorded EEG as stipulated. Clinical correlation is advised.   TRAMAINE TREJO.

## 2018-11-28 DIAGNOSIS — G40.209 LOCALIZATION-RELATED (FOCAL) (PARTIAL) SYMPTOMATIC EPILEPSY AND EPILEPTIC SYNDROMES WITH COMPLEX PARTIAL SEIZURES, NOT INTRACTABLE, WITHOUT STATUS EPILEPTICUS (HCC): ICD-10-CM

## 2018-11-28 RX ORDER — LEVETIRACETAM 500 MG/1
TABLET ORAL
Qty: 120 TAB | Refills: 3 | Status: SHIPPED | OUTPATIENT
Start: 2018-11-28 | End: 2019-03-11 | Stop reason: SDUPTHER

## 2019-01-23 ENCOUNTER — ED HISTORICAL/CONVERTED ENCOUNTER (OUTPATIENT)
Dept: OTHER | Age: 34
End: 2019-01-23

## 2019-01-31 ENCOUNTER — ED HISTORICAL/CONVERTED ENCOUNTER (OUTPATIENT)
Dept: OTHER | Age: 34
End: 2019-01-31

## 2019-03-11 ENCOUNTER — TELEPHONE (OUTPATIENT)
Dept: NEUROLOGY | Age: 34
End: 2019-03-11

## 2019-03-11 DIAGNOSIS — G40.209 LOCALIZATION-RELATED (FOCAL) (PARTIAL) SYMPTOMATIC EPILEPSY AND EPILEPTIC SYNDROMES WITH COMPLEX PARTIAL SEIZURES, NOT INTRACTABLE, WITHOUT STATUS EPILEPTICUS (HCC): ICD-10-CM

## 2019-03-11 NOTE — TELEPHONE ENCOUNTER
----- Message from Herbert Wiley sent at 3/11/2019 12:56 PM EDT -----  Regarding: AUSTIN Modi/rx refill  Pt's mother Claudette Rule Lovejoy/(p) [phone], said her son  Ran out of his  Refills  for his levetiracetam 500 mg medication  for his [place] , the one listed in his chart

## 2019-03-12 RX ORDER — LEVETIRACETAM 500 MG/1
TABLET ORAL
Qty: 120 TAB | Refills: 0 | Status: SHIPPED | OUTPATIENT
Start: 2019-03-12 | End: 2019-03-18 | Stop reason: SDUPTHER

## 2019-03-17 ENCOUNTER — ED HISTORICAL/CONVERTED ENCOUNTER (OUTPATIENT)
Dept: OTHER | Age: 34
End: 2019-03-17

## 2019-03-18 RX ORDER — LEVETIRACETAM 500 MG/1
TABLET ORAL
Qty: 120 TAB | Refills: 0 | Status: SHIPPED | OUTPATIENT
Start: 2019-03-18 | End: 2019-04-18

## 2019-03-18 NOTE — TELEPHONE ENCOUNTER
Patient's mother calling to ask [facility]. Place pharmacy in [facility].  [phone]

## 2019-04-18 ENCOUNTER — OFFICE VISIT (OUTPATIENT)
Dept: NEUROLOGY | Age: 34
End: 2019-04-18

## 2019-04-18 VITALS
WEIGHT: 138 LBS | DIASTOLIC BLOOD PRESSURE: 86 MMHG | BODY MASS INDEX: 27.09 KG/M2 | HEIGHT: 60 IN | SYSTOLIC BLOOD PRESSURE: 118 MMHG

## 2019-04-18 DIAGNOSIS — G40.209 LOCALIZATION-RELATED (FOCAL) (PARTIAL) SYMPTOMATIC EPILEPSY AND EPILEPTIC SYNDROMES WITH COMPLEX PARTIAL SEIZURES, NOT INTRACTABLE, WITHOUT STATUS EPILEPTICUS (HCC): ICD-10-CM

## 2019-04-18 DIAGNOSIS — G40.109 PARTIAL EPILEPSY (HCC): Primary | ICD-10-CM

## 2019-04-18 RX ORDER — LEVETIRACETAM 1000 MG/1
1000 TABLET ORAL 2 TIMES DAILY
Qty: 60 TAB | Refills: 5 | Status: SHIPPED | OUTPATIENT
Start: 2019-04-18 | End: 2019-09-05

## 2019-04-18 NOTE — PATIENT INSTRUCTIONS
Start Aptiom today. Take 400 mg once at bedtime X 1 week     Then increase to 600 mg nightly  X 1 week     Then increase to 800 mg nightly  X 1 week     Then increase to 1200 mg nightly X 1 week     Then increase to 1600 mg nightly and stay here.

## 2019-04-18 NOTE — PROGRESS NOTES
3-4 seizures minimum since past visit. Went to ER at [address] [place] [address] sometime in February for seizure where he fell and hit his head. They put staples in to close the laceration. Only taking keppra 2 tab BID.

## 2019-04-18 NOTE — PROGRESS NOTES
Madeline Brumfield is a 28 y.o. male who presents with the following  Chief Complaint   Patient presents with    Seizure       HPI       Patient comes in for a follow up for partial epilepsy. Currently on Keppra 1000 mg BID. Has had multiple seizures since last visit as 5 were witnessed. Recently was last week. His caregiver found him in bed seizing, twitching, jerking, wet his pants. He Is taking medications as prescribed. Failed Depakote, Vimpat. Allergies   Allergen Reactions    Penicillins Rash       Current Outpatient Medications   Medication Sig    eslicarbazepine (APTIOM) 800 mg tab tablet Take 2 Tabs by mouth daily.  levETIRAcetam (KEPPRA) 1,000 mg tablet Take 1 Tab by mouth two (2) times a day. No current facility-administered medications for this visit. Social History     Tobacco Use   Smoking Status Current Every Day Smoker    Packs/day: 1.00    Years: 10.00    Pack years: 10.00   Smokeless Tobacco Never Used       Past Medical History:   Diagnosis Date    Neurological disorder     Seizures ([facility]        History reviewed. No pertinent surgical history. History reviewed. No pertinent family history. Social History     Socioeconomic History    Marital status: SINGLE     Spouse name: Not on file    Number of children: Not on file    Years of education: Not on file    Highest education level: Not on file   Tobacco Use    Smoking status: Current Every Day Smoker     Packs/day: 1.00     Years: 10.00     Pack years: 10.00    Smokeless tobacco: Never Used   Substance and Sexual Activity    Alcohol use: Yes    Drug use: No    Sexual activity: Yes       Review of Systems   Eyes: Negative for blurred vision and double vision. Respiratory: Negative for shortness of breath and wheezing. Cardiovascular: Negative for chest pain and palpitations. Gastrointestinal: Negative for nausea and vomiting. Neurological: Positive for seizures and loss of consciousness. Negative for dizziness and headaches. Psychiatric/Behavioral: The patient does not have insomnia. Remainder of comprehensive review is negative. Physical Exam :    Visit Vitals  /86   Ht 5' (1.524 m)   Wt 62.6 kg (138 lb)   BMI 26.95 kg/m²       General: Well defined, nourished, and groomed individual in no acute distress.    Neck: Supple, nontender, no bruits, no pain with resistance to active range of motion.    Heart: Regular rate and rhythm, no murmurs, rub, or gallop. Normal S1S2. Lungs: Clear to auscultation bilaterally with equal chest expansion, no cough, no wheeze  Musculoskeletal: Extremities revealed no edema and had full range of motion of joints.    Psych: Flat affect. NEUROLOGICAL EXAMINATION:    Mental Status: Alert and oriented to person, place, and time    Cranial Nerves:    II, III, IV, VI: Visual acuity grossly intact. Visual fields are normal.    Pupils are equal, round, and reactive to light and accommodation.    Extra-ocular movements are full and fluid. Fundoscopic exam was benign, no ptosis or nystagmus.    V-XII: Hearing is grossly intact. Facial features are symmetric, with normal sensation and strength. The palate rises symmetrically and the tongue protrudes midline. Sternocleidomastoids 5/5. Motor Examination: Normal tone, bulk, and strength, 5/5 muscle strength throughout. Coordination: Finger to nose was normal. No resting or intention tremor    Gait and Station: Steady while walking. Normal arm swing. No pronator drift. No muscle wasting or fasiculations noted. Reflexes: DTRs 2+ throughout.             Results for orders placed or performed during the hospital encounter of 02/26/18   VALPROIC ACID   Result Value Ref Range    Valproic acid 67 50 - 100 ug/ml   CBC WITH AUTOMATED DIFF   Result Value Ref Range    WBC 4.8 4.1 - 11.1 K/uL    RBC 4.66 4.10 - 5.70 M/uL    HGB 14.4 12.1 - 17.0 g/dL    HCT 41.2 36.6 - 50.3 %    MCV 88.4 80.0 - 99.0 FL    MCH 30.9 26.0 - 34.0 PG    MCHC 35.0 30.0 - 36.5 g/dL    RDW 12.5 11.5 - 14.5 %    PLATELET 739 843 - 879 K/uL    MPV 11.5 8.9 - 12.9 FL    NRBC 0.0 0  WBC    ABSOLUTE NRBC 0.00 0.00 - 0.01 K/uL    NEUTROPHILS 45 32 - 75 %    LYMPHOCYTES 34 12 - 49 %    MONOCYTES 16 (H) 5 - 13 %    EOSINOPHILS 5 0 - 7 %    BASOPHILS 1 0 - 1 %    IMMATURE GRANULOCYTES 0 0.0 - 0.5 %    ABS. NEUTROPHILS 2.2 1.8 - 8.0 K/UL    ABS. LYMPHOCYTES 1.7 0.8 - 3.5 K/UL    ABS. MONOCYTES 0.8 0.0 - 1.0 K/UL    ABS. EOSINOPHILS 0.2 0.0 - 0.4 K/UL    ABS. BASOPHILS 0.0 0.0 - 0.1 K/UL    ABS. IMM. GRANS. 0.0 0.00 - 0.04 K/UL    DF AUTOMATED     MAGNESIUM   Result Value Ref Range    Magnesium 1.7 1.6 - 2.4 mg/dL   LEVETIRACETAM (KEPPRA)   Result Value Ref Range    Levetiracetam (Keppra) 3.4 (L) 10.0 - 40.0 ug/mL       Orders Placed This Encounter    eslicarbazepine (APTIOM) 800 mg tab tablet     Sig: Take 2 Tabs by mouth daily. Dispense:  60 Tab     Refill:  5    levETIRAcetam (KEPPRA) 1,000 mg tablet     Sig: Take 1 Tab by mouth two (2) times a day. Dispense:  60 Tab     Refill:  5       1. Partial epilepsy ([address]    2. Localization-related (focal) (partial) symptomatic epilepsy and epileptic syndromes with complex partial seizures, not intractable, without status epilepticus ([address]        Partial epilepsy not controlled. Keep Keppra 1000 mg BID for prevention. Add Aptiom as FDA indicated and approved treatment for partial epilepsy as Vimpat, Depakote have been used before. Titrate up to 1600 mg daily. Discussed side effects and they have no concerns. Not driving.              This note will not be viewable in [employer]

## 2019-04-19 ENCOUNTER — TELEPHONE (OUTPATIENT)
Dept: NEUROLOGY | Age: 34
End: 2019-04-19

## 2019-04-19 NOTE — TELEPHONE ENCOUNTER
R/t call to [facility], on permission to release to discuss info. She had question about the keppra because patient was taking 2 tab BID and directions now state 1 tabBId. Informed her that dosing for the keppra was increased and patient is still taking the same dosing. She states understanding.      No further questions at this time

## 2019-04-19 NOTE — TELEPHONE ENCOUNTER
----- Message from Lesly Soriano sent at 4/19/2019 10:19 AM EDT -----  Regarding: AUSTIN Silver/Telephone  Pt's mother(Macy Houser) has questions regarding directions for pt's medication(\"Keppra) and requested a call from the nurse this morning. Best contact number [phone].

## 2019-09-05 ENCOUNTER — OFFICE VISIT (OUTPATIENT)
Dept: NEUROLOGY | Age: 34
End: 2019-09-05

## 2019-09-05 DIAGNOSIS — G40.109 PARTIAL EPILEPSY (HCC): ICD-10-CM

## 2019-09-05 RX ORDER — LEVETIRACETAM 500 MG/1
3000 TABLET, EXTENDED RELEASE ORAL
Qty: 180 TAB | Refills: 5 | Status: SHIPPED | OUTPATIENT
Start: 2019-09-05 | End: 2020-09-25 | Stop reason: SDUPTHER

## 2019-09-05 RX ORDER — LEVETIRACETAM 750 MG/1
3000 TABLET, EXTENDED RELEASE ORAL
Qty: 120 TAB | Refills: 5 | Status: SHIPPED | OUTPATIENT
Start: 2019-09-05 | End: 2019-09-05

## 2019-09-05 NOTE — PROGRESS NOTES
Leyda Dinh is a 28 y.o. male who presents with the following  No chief complaint on file. HPI     Patient comes in for a follow up for partial epilepsy. Currently on Keppra 1000 mg BID and we added Aptiom up to 1600 mg daily last visit. Has only had 3 breakthrough seizures per mother report. Since April. Recently yesterday. His caregiver found him in bed seizing, twitching, jerking, wet his pants. He admits no missing medications. Compliant with treatment. He also states he is sleeping well but mother says he stays up very late. Not sick. He Is taking medications as prescribed. Failed Depakote, Vimpat. Allergies   Allergen Reactions    Penicillins Rash       Current Outpatient Medications   Medication Sig    levETIRAcetam (KEPPRA XR) 750 mg ER tablet Take 4 Tabs by mouth nightly.  eslicarbazepine (APTIOM) 800 mg tab tablet Take 2 Tabs by mouth nightly. No current facility-administered medications for this visit. Social History     Tobacco Use   Smoking Status Current Every Day Smoker    Packs/day: 1.00    Years: 10.00    Pack years: 10.00   Smokeless Tobacco Never Used       Past Medical History:   Diagnosis Date    Neurological disorder     Seizures ([facility]        No past surgical history on file. No family history on file. Social History     Socioeconomic History    Marital status: SINGLE     Spouse name: Not on file    Number of children: Not on file    Years of education: Not on file    Highest education level: Not on file   Tobacco Use    Smoking status: Current Every Day Smoker     Packs/day: 1.00     Years: 10.00     Pack years: 10.00    Smokeless tobacco: Never Used   Substance and Sexual Activity    Alcohol use: Yes    Drug use: No    Sexual activity: Yes       Review of Systems   Eyes: Negative for blurred vision and double vision. Cardiovascular: Negative for chest pain and palpitations.    Gastrointestinal: Negative for nausea and vomiting. Neurological: Positive for seizures and loss of consciousness. Remainder of comprehensive review is negative. Physical Exam :    There were no vitals taken for this visit. General: Well defined, nourished, and groomed individual in no acute distress.    Neck: Supple, nontender, no bruits, no pain with resistance to active range of motion.    Heart: Regular rate and rhythm, no murmurs, rub, or gallop. Normal S1S2. Lungs: Clear to auscultation bilaterally with equal chest expansion, no cough, no wheeze  Musculoskeletal: Extremities revealed no edema and had full range of motion of joints.    Psych: Good mood and bright affect    NEUROLOGICAL EXAMINATION:    Mental Status: Alert and oriented to person, place, and time    Cranial Nerves:    II, III, IV, VI: Visual acuity grossly intact. Visual fields are normal.    Pupils are equal, round, and reactive to light and accommodation.    Extra-ocular movements are full and fluid. Fundoscopic exam was benign, no ptosis or nystagmus.    V-XII: Hearing is grossly intact. Facial features are symmetric, with normal sensation and strength. The palate rises symmetrically and the tongue protrudes midline. Sternocleidomastoids 5/5. Motor Examination: Normal tone, bulk, and strength, 5/5 muscle strength throughout. Coordination: Finger to nose was normal. No resting or intention tremor    Gait and Station: Steady while walking. Normal arm swing. No pronator drift. No muscle wasting or fasiculations noted. Reflexes: DTRs 2+ throughout.             Results for orders placed or performed during the hospital encounter of 02/26/18   VALPROIC ACID   Result Value Ref Range    Valproic acid 67 50 - 100 ug/ml   CBC WITH AUTOMATED DIFF   Result Value Ref Range    WBC 4.8 4.1 - 11.1 K/uL    RBC 4.66 4.10 - 5.70 M/uL    HGB 14.4 12.1 - 17.0 g/dL    HCT 41.2 36.6 - 50.3 %    MCV 88.4 80.0 - 99.0 FL    MCH 30.9 26.0 - 34.0 PG    MCHC 35.0 30.0 - 36.5 g/dL RDW 12.5 11.5 - 14.5 %    PLATELET 753 962 - 472 K/uL    MPV 11.5 8.9 - 12.9 FL    NRBC 0.0 0  WBC    ABSOLUTE NRBC 0.00 0.00 - 0.01 K/uL    NEUTROPHILS 45 32 - 75 %    LYMPHOCYTES 34 12 - 49 %    MONOCYTES 16 (H) 5 - 13 %    EOSINOPHILS 5 0 - 7 %    BASOPHILS 1 0 - 1 %    IMMATURE GRANULOCYTES 0 0.0 - 0.5 %    ABS. NEUTROPHILS 2.2 1.8 - 8.0 K/UL    ABS. LYMPHOCYTES 1.7 0.8 - 3.5 K/UL    ABS. MONOCYTES 0.8 0.0 - 1.0 K/UL    ABS. EOSINOPHILS 0.2 0.0 - 0.4 K/UL    ABS. BASOPHILS 0.0 0.0 - 0.1 K/UL    ABS. IMM. GRANS. 0.0 0.00 - 0.04 K/UL    DF AUTOMATED     MAGNESIUM   Result Value Ref Range    Magnesium 1.7 1.6 - 2.4 mg/dL   LEVETIRACETAM (KEPPRA)   Result Value Ref Range    Levetiracetam (Keppra) 3.4 (L) 10.0 - 40.0 ug/mL       Orders Placed This Encounter    levETIRAcetam (KEPPRA XR) 750 mg ER tablet     Sig: Take 4 Tabs by mouth nightly. Dispense:  120 Tab     Refill:  5    eslicarbazepine (APTIOM) 800 mg tab tablet     Sig: Take 2 Tabs by mouth nightly. Dispense:  60 Tab     Refill:  5       1. Partial epilepsy ([address]            Partial epilepsy. Keep Aptiom 1600 mg. Switch Keppra to XR and increase to 3000 mg at night to help with prevention and XR as in case he is missing medications this can  Be easier and last longer for him in system to hopefully prevent breakthroughs. Can FU with labs at next visit. Call with changes.          This note will not be viewable in [employer]

## 2019-09-16 ENCOUNTER — TELEPHONE (OUTPATIENT)
Dept: NEUROLOGY | Age: 34
End: 2019-09-16

## 2019-09-16 NOTE — TELEPHONE ENCOUNTER
----- Message from Lakeisha Ham sent at 9/16/2019  2:23 PM EDT -----  Regarding: NP Radha/Telephone  General Message/Vendor Calls    Caller's first and last name: Juan Francisco Maynard, Mother      Reason for call: Advising [facility] does not carry Kepra 750mg, extended release capsules. Callback required yes/no and why: Yes      Best contact number(s): [phone]      Details to clarify the request: Stating provider has not reached out to pharmacy to advise of medication change, pharmacy is waiting on instructions.       Lakeisha Ham

## 2019-09-17 NOTE — TELEPHONE ENCOUNTER
Do they have the 500 mg XR tablets and can do a total of 6 at night? Can you call [facility] at [facility] and see if they have too. If so we can send there?

## 2019-09-19 ENCOUNTER — TELEPHONE (OUTPATIENT)
Dept: NEUROLOGY | Age: 34
End: 2019-09-19

## 2019-09-19 NOTE — TELEPHONE ENCOUNTER
[employer]. They do not have any extended release Keppra right now. All is  back order. No [facility] has this medication right now because they all have the same supplier.

## 2019-09-19 NOTE — TELEPHONE ENCOUNTER
----- Message from Selwyn Madera sent at 9/19/2019  3:22 PM EDT -----  Regarding: Mitul Silver/ refill  General Message/Vendor Calls    Caller's first and last name: Priscilla vicente, mother      Reason for call: to see if the pt's \"keppra 750mg\" extended release has been updated since the  doesn't make that       Callback required yes/no and why: yes      Best contact number(s): [phone]      Details to clarify the request:      Hermelinda Narayanan

## 2019-11-14 ENCOUNTER — TELEPHONE (OUTPATIENT)
Dept: NEUROLOGY | Age: 34
End: 2019-11-14

## 2019-11-14 RX ORDER — LEVETIRACETAM 1000 MG/1
TABLET ORAL
Qty: 60 TAB | Refills: 0 | Status: SHIPPED | OUTPATIENT
Start: 2019-11-14 | End: 2019-11-14 | Stop reason: SDUPTHER

## 2019-11-14 RX ORDER — LEVETIRACETAM 1000 MG/1
TABLET ORAL
Qty: 60 TAB | Refills: 0 | Status: SHIPPED | OUTPATIENT
Start: 2019-11-14 | End: 2020-01-23

## 2019-11-14 NOTE — TELEPHONE ENCOUNTER
----- Message from Kristofer Hou sent at 2019 12:34 PM EST -----  Regarding: Dr. Christian Spotted first and last name: Amandeep Peace , mom     Reason for call: refill     Callback required yes/no and why: yes, advised by Pharmacy     Best contact number(s): [phone]    Details to clarify the request: Amandeep Peace , mom , was advised by [facility] [address] that medication Refill for \" Johnnye Pisgah" was sent over to the practice and for her to check with the  About refills.        Kristofer Hou

## 2020-01-23 RX ORDER — LEVETIRACETAM 1000 MG/1
TABLET ORAL
Qty: 60 TAB | Refills: 0 | Status: SHIPPED | OUTPATIENT
Start: 2020-01-23 | End: 2020-02-27

## 2020-02-27 RX ORDER — LEVETIRACETAM 1000 MG/1
TABLET ORAL
Qty: 60 TAB | Refills: 0 | Status: SHIPPED | OUTPATIENT
Start: 2020-02-27 | End: 2020-03-25

## 2020-03-14 DIAGNOSIS — G40.109 PARTIAL EPILEPSY (HCC): ICD-10-CM

## 2020-03-16 RX ORDER — ESLICARBAZEPINE ACETATE 800 MG/1
TABLET ORAL
Qty: 60 TAB | Refills: 5 | Status: SHIPPED | OUTPATIENT
Start: 2020-03-16 | End: 2020-09-22

## 2020-03-25 RX ORDER — LEVETIRACETAM 1000 MG/1
TABLET ORAL
Qty: 60 TAB | Refills: 0 | Status: SHIPPED | OUTPATIENT
Start: 2020-03-25 | End: 2020-04-29

## 2020-04-29 RX ORDER — LEVETIRACETAM 1000 MG/1
TABLET ORAL
Qty: 60 TAB | Refills: 0 | Status: SHIPPED | OUTPATIENT
Start: 2020-04-29 | End: 2020-06-04

## 2020-06-04 RX ORDER — LEVETIRACETAM 1000 MG/1
TABLET ORAL
Qty: 60 TAB | Refills: 0 | Status: SHIPPED | OUTPATIENT
Start: 2020-06-04 | End: 2020-07-08

## 2020-07-08 RX ORDER — LEVETIRACETAM 1000 MG/1
TABLET ORAL
Qty: 60 TAB | Refills: 0 | Status: SHIPPED | OUTPATIENT
Start: 2020-07-08 | End: 2020-08-03

## 2020-08-03 RX ORDER — LEVETIRACETAM 1000 MG/1
TABLET ORAL
Qty: 60 TAB | Refills: 0 | Status: SHIPPED | OUTPATIENT
Start: 2020-08-03 | End: 2020-09-08

## 2020-09-08 RX ORDER — LEVETIRACETAM 1000 MG/1
TABLET ORAL
Qty: 60 TAB | Refills: 0 | Status: SHIPPED | OUTPATIENT
Start: 2020-09-08 | End: 2020-09-25

## 2020-09-21 DIAGNOSIS — G40.109 PARTIAL EPILEPSY (HCC): ICD-10-CM

## 2020-09-22 RX ORDER — ESLICARBAZEPINE ACETATE 800 MG/1
TABLET ORAL
Qty: 60 TAB | Refills: 5 | Status: SHIPPED | OUTPATIENT
Start: 2020-09-22 | End: 2020-09-24 | Stop reason: SDUPTHER

## 2020-09-24 DIAGNOSIS — G40.109 PARTIAL EPILEPSY (HCC): ICD-10-CM

## 2020-09-24 RX ORDER — ESLICARBAZEPINE ACETATE 800 MG/1
TABLET ORAL
Qty: 60 TAB | Refills: 5 | Status: SHIPPED | OUTPATIENT
Start: 2020-09-24 | End: 2021-04-05

## 2020-09-25 ENCOUNTER — VIRTUAL VISIT (OUTPATIENT)
Dept: NEUROLOGY | Age: 35
End: 2020-09-25
Payer: MEDICARE

## 2020-09-25 DIAGNOSIS — G40.909 SEIZURE DISORDER (HCC): ICD-10-CM

## 2020-09-25 DIAGNOSIS — G40.909 SEIZURE DISORDER (HCC): Primary | ICD-10-CM

## 2020-09-25 PROCEDURE — 99214 OFFICE O/P EST MOD 30 MIN: CPT | Performed by: NURSE PRACTITIONER

## 2020-09-25 PROCEDURE — G8432 DEP SCR NOT DOC, RNG: HCPCS | Performed by: NURSE PRACTITIONER

## 2020-09-25 PROCEDURE — G8428 CUR MEDS NOT DOCUMENT: HCPCS | Performed by: NURSE PRACTITIONER

## 2020-09-25 RX ORDER — LEVETIRACETAM 500 MG/1
3000 TABLET, EXTENDED RELEASE ORAL
Qty: 180 TAB | Refills: 5 | Status: SHIPPED | OUTPATIENT
Start: 2020-09-25 | End: 2021-04-12

## 2020-09-25 RX ORDER — ACETAMINOPHEN, DIPHENHYDRAMINE HCL, PHENYLEPHRINE HCL 325; 25; 5 MG/1; MG/1; MG/1
1 TABLET ORAL
Qty: 30 TAB | Refills: 5 | Status: SHIPPED | OUTPATIENT
Start: 2020-09-25

## 2020-09-25 NOTE — PROGRESS NOTES
Breana Villagomez is a 27 y.o. male who was seen by synchronous (real-time) audio-video technology on 9/25/2020 for Follow-up and Epilepsy        FU for seizures via virtual visit. Doing well per report. Can not remember his last seizure. He has been doing well on Aptiom and Keppra XR. Aptiom 1600 mg nightly and Keppra XR 6 tablets nightly. Family has not seen any breakthrough seizures either. He has noticed no side effects. Has been trying to lay low with virus. Does want to get his flu shot soon. He also notices that his sleep and appetite are normal.  Not had any blood in a while. Assessment & Plan:   Diagnoses and all orders for this visit:    1. Seizure disorder ([facility] [address]  -     CBC WITH AUTOMATED DIFF; Future  -     METABOLIC PANEL, COMPREHENSIVE; Future  -     LEVETIRACETAM (KEPPRA); Future    Other orders  -     levETIRAcetam (KEPPRA XR) 500 mg ER tablet; Take 6 Tabs by mouth nightly. -     melatonin 10 mg tab; Take 1 Tab by mouth nightly. Subjective:       Prior to Admission medications    Medication Sig Start Date End Date Taking? Authorizing Provider   levETIRAcetam (KEPPRA XR) 500 mg ER tablet Take 6 Tabs by mouth nightly. 9/25/20  Yes Douglas Garcia NP   melatonin 10 mg tab Take 1 Tab by mouth nightly. 9/25/20  Yes ODouglas Medina NP   eslicarbazepine (Aptiom) 800 mg tab tablet take 2 tablets by mouth every evening 9/24/20  Yes Douglas Garcia NP   levETIRAcetam 1,000 mg tablet take 1 tablet by mouth twice a day 9/8/20 9/25/20  ODouglas Mdeina NP   levETIRAcetam (KEPPRA XR) 500 mg ER tablet Take 6 Tabs by mouth nightly.  9/5/19 9/25/20  ODouglas Medina NP     Patient Active Problem List   Diagnosis Code    Other convulsions R56.9     Patient Active Problem List    Diagnosis Date Noted    Other convulsions 04/09/2015     Current Outpatient Medications   Medication Sig Dispense Refill    levETIRAcetam (KEPPRA XR) 500 mg ER tablet Take 6 Tabs by mouth nightly. 180 Tab 5    melatonin 10 mg tab Take 1 Tab by mouth nightly. 30 Tab 5    eslicarbazepine (Aptiom) 800 mg tab tablet take 2 tablets by mouth every evening 60 Tab 5     Allergies   Allergen Reactions    Penicillins Rash     Past Medical History:   Diagnosis Date    Neurological disorder     Seizures (HCC)      No past surgical history on file. No family history on file. Social History     Tobacco Use    Smoking status: Current Every Day Smoker     Packs/day: 1.00     Years: 10.00     Pack years: 10.00    Smokeless tobacco: Never Used   Substance Use Topics    Alcohol use: Yes       Review of Systems   Eyes: Negative for blurred vision, double vision and photophobia. Gastrointestinal: Negative for nausea and vomiting. Neurological: Negative for dizziness, tingling, seizures, loss of consciousness and headaches. Objective:   No flowsheet data found.      [INSTRUCTIONS:  \"[x]\" Indicates a positive item  \"[]\" Indicates a negative item  -- DELETE ALL ITEMS NOT EXAMINED]    Constitutional: [x] Appears well-developed and well-nourished [x] No apparent distress      [] Abnormal -     Mental status: [x] Alert and awake  [x] Oriented to person/place/time [x] Able to follow commands    [] Abnormal -     Eyes:   EOM    [x]  Normal    [] Abnormal -   Sclera  [x]  Normal    [] Abnormal -          Discharge [x]  None visible   [] Abnormal -     HENT: [x] Normocephalic, atraumatic  [] Abnormal -   [x] Mouth/Throat: Mucous membranes are moist    External Ears [x] Normal  [] Abnormal -    Neck: [x] No visualized mass [] Abnormal -     Pulmonary/Chest: [x] Respiratory effort normal   [x] No visualized signs of difficulty breathing or respiratory distress        [] Abnormal -      Musculoskeletal:   [x] Normal gait with no signs of ataxia         [x] Normal range of motion of neck        [] Abnormal -     Neurological:        [x] No Facial Asymmetry (Cranial nerve 7 motor function) (limited exam due to video visit)          [x] No gaze palsy        [] Abnormal -          Skin:        [x] No significant exanthematous lesions or discoloration noted on facial skin         [] Abnormal -            Psychiatric:       [x] Normal Affect [] Abnormal -        [x] No Hallucinations    Other pertinent observable physical exam findings:-      Keep Aptiom, Keppra XR for seizure prevention. Continue to stay active and engaged. Try Melatonin 10 mg for sleep   Blood to look at levels, liver, kidneys,   FU 6 months         We discussed the expected course, resolution and complications of the diagnosis(es) in detail. Medication risks, benefits, costs, interactions, and alternatives were discussed as indicated. I advised him to contact the office if his condition worsens, changes or fails to improve as anticipated. He expressed understanding with the diagnosis(es) and plan. Larry Wick, who was evaluated through a patient-initiated, synchronous (real-time) audio-video encounter, and/or his healthcare decision maker, is aware that it is a billable service, with coverage as determined by his insurance carrier. He provided verbal consent to proceed: Yes, and patient identification was verified. It was conducted pursuant to the emergency declaration under the [address] [place] [address], [address] [place] [address] authority and the [employer] Act. A caregiver was present when appropriate. Ability to conduct physical exam was limited. I was in the office. The patient was at home.       Erika Golden NP  .

## 2020-10-27 ENCOUNTER — HOSPITAL ENCOUNTER (EMERGENCY)
Age: 35
Discharge: HOME | End: 2020-10-28
Attending: EMERGENCY MEDICINE
Payer: MEDICARE

## 2020-10-27 ENCOUNTER — APPOINTMENT (OUTPATIENT)
Dept: GENERAL RADIOLOGY | Age: 35
End: 2020-10-27
Attending: EMERGENCY MEDICINE
Payer: MEDICARE

## 2020-10-27 ENCOUNTER — APPOINTMENT (OUTPATIENT)
Dept: CT IMAGING | Age: 35
End: 2020-10-27
Attending: EMERGENCY MEDICINE
Payer: MEDICARE

## 2020-10-27 DIAGNOSIS — R56.9 SEIZURE (HCC): Primary | ICD-10-CM

## 2020-10-27 PROCEDURE — 99285 EMERGENCY DEPT VISIT HI MDM: CPT

## 2020-10-27 PROCEDURE — 74011250636 HC RX REV CODE- 250/636: Performed by: EMERGENCY MEDICINE

## 2020-10-27 PROCEDURE — 72125 CT NECK SPINE W/O DYE: CPT

## 2020-10-27 PROCEDURE — 96372 THER/PROPH/DIAG INJ SC/IM: CPT

## 2020-10-27 PROCEDURE — 70450 CT HEAD/BRAIN W/O DYE: CPT

## 2020-10-27 PROCEDURE — 71045 X-RAY EXAM CHEST 1 VIEW: CPT

## 2020-10-27 RX ORDER — ZIPRASIDONE MESYLATE 20 MG/ML
20 INJECTION, POWDER, LYOPHILIZED, FOR SOLUTION INTRAMUSCULAR
Status: DISCONTINUED | OUTPATIENT
Start: 2020-10-27 | End: 2020-10-28 | Stop reason: HOSPADM

## 2020-10-27 RX ORDER — SODIUM CHLORIDE 9 MG/ML
150 INJECTION, SOLUTION INTRAVENOUS CONTINUOUS
Status: DISCONTINUED | OUTPATIENT
Start: 2020-10-27 | End: 2020-10-28 | Stop reason: HOSPADM

## 2020-10-27 RX ORDER — LEVETIRACETAM 10 MG/ML
1000 INJECTION INTRAVASCULAR EVERY 12 HOURS
Status: DISCONTINUED | OUTPATIENT
Start: 2020-10-27 | End: 2020-10-28 | Stop reason: HOSPADM

## 2020-10-27 RX ORDER — ZIPRASIDONE MESYLATE 20 MG/ML
20 INJECTION, POWDER, LYOPHILIZED, FOR SOLUTION INTRAMUSCULAR
Status: COMPLETED | OUTPATIENT
Start: 2020-10-27 | End: 2020-10-27

## 2020-10-27 RX ADMIN — ZIPRASIDONE MESYLATE 20 MG: 20 INJECTION, POWDER, LYOPHILIZED, FOR SOLUTION INTRAMUSCULAR at 23:32

## 2020-10-28 VITALS
BODY MASS INDEX: 20.4 KG/M2 | OXYGEN SATURATION: 98 % | TEMPERATURE: 98 F | WEIGHT: 130 LBS | HEIGHT: 67 IN | DIASTOLIC BLOOD PRESSURE: 72 MMHG | HEART RATE: 88 BPM | SYSTOLIC BLOOD PRESSURE: 126 MMHG | RESPIRATION RATE: 15 BRPM

## 2020-10-28 LAB
ALBUMIN SERPL-MCNC: 4.2 G/DL (ref 3.5–5)
ALBUMIN/GLOB SERPL: 1.3 {RATIO} (ref 1.1–2.2)
ALP SERPL-CCNC: 104 U/L (ref 45–117)
ALT SERPL-CCNC: 43 U/L (ref 12–78)
ANION GAP SERPL CALC-SCNC: 8 MMOL/L (ref 5–15)
AST SERPL W P-5'-P-CCNC: 34 U/L (ref 15–37)
BASOPHILS # BLD: 0 K/UL (ref 0–0.1)
BASOPHILS NFR BLD: 0 % (ref 0–1)
BILIRUB SERPL-MCNC: 0.3 MG/DL (ref 0.2–1)
BUN SERPL-MCNC: 6 MG/DL (ref 6–20)
BUN/CREAT SERPL: 5 (ref 12–20)
CA-I BLD-MCNC: 9.3 MG/DL (ref 8.5–10.1)
CHLORIDE SERPL-SCNC: 106 MMOL/L (ref 97–108)
CO2 SERPL-SCNC: 25 MMOL/L (ref 21–32)
CREAT SERPL-MCNC: 1.18 MG/DL (ref 0.7–1.3)
DIFFERENTIAL METHOD BLD: ABNORMAL
EOSINOPHIL # BLD: 0 K/UL (ref 0–0.4)
EOSINOPHIL NFR BLD: 0 % (ref 0–7)
ERYTHROCYTE [DISTWIDTH] IN BLOOD BY AUTOMATED COUNT: 12.7 % (ref 11.5–14.5)
ETHANOL SERPL-MCNC: <4 MG/DL
GLOBULIN SER CALC-MCNC: 3.2 G/DL (ref 2–4)
GLUCOSE SERPL-MCNC: 85 MG/DL (ref 65–100)
HCT VFR BLD AUTO: 43.1 % (ref 36.6–50.3)
HGB BLD-MCNC: 15.3 G/DL (ref 12.1–17)
IMM GRANULOCYTES # BLD AUTO: 0 K/UL (ref 0–0.04)
IMM GRANULOCYTES NFR BLD AUTO: 0 % (ref 0–0.5)
LYMPHOCYTES # BLD: 0.8 K/UL (ref 0.8–3.5)
LYMPHOCYTES NFR BLD: 9 % (ref 12–49)
MCH RBC QN AUTO: 31.3 PG (ref 26–34)
MCHC RBC AUTO-ENTMCNC: 35.5 G/DL (ref 30–36.5)
MCV RBC AUTO: 88.1 FL (ref 80–99)
MONOCYTES # BLD: 0.6 K/UL (ref 0–1)
MONOCYTES NFR BLD: 6 % (ref 5–13)
NEUTS SEG # BLD: 8.3 K/UL (ref 1.8–8)
NEUTS SEG NFR BLD: 85 % (ref 32–75)
PLATELET # BLD AUTO: 252 K/UL (ref 150–400)
PMV BLD AUTO: 10.3 FL (ref 8.9–12.9)
POTASSIUM SERPL-SCNC: 4 MMOL/L (ref 3.5–5.1)
PROT SERPL-MCNC: 7.4 G/DL (ref 6.4–8.2)
RBC # BLD AUTO: 4.89 M/UL (ref 4.1–5.7)
SODIUM SERPL-SCNC: 139 MMOL/L (ref 136–145)
WBC # BLD AUTO: 9.7 K/UL (ref 4.1–11.1)

## 2020-10-28 PROCEDURE — 36415 COLL VENOUS BLD VENIPUNCTURE: CPT

## 2020-10-28 PROCEDURE — 85025 COMPLETE CBC W/AUTO DIFF WBC: CPT

## 2020-10-28 PROCEDURE — 80307 DRUG TEST PRSMV CHEM ANLYZR: CPT

## 2020-10-28 PROCEDURE — 74011250636 HC RX REV CODE- 250/636: Performed by: EMERGENCY MEDICINE

## 2020-10-28 PROCEDURE — 96374 THER/PROPH/DIAG INJ IV PUSH: CPT

## 2020-10-28 PROCEDURE — 80053 COMPREHEN METABOLIC PANEL: CPT

## 2020-10-28 PROCEDURE — 80177 DRUG SCRN QUAN LEVETIRACETAM: CPT

## 2020-10-28 RX ADMIN — LEVETIRACETAM 1000 MG: 10 INJECTION INTRAVENOUS at 00:05

## 2020-10-28 RX ADMIN — SODIUM CHLORIDE 150 ML/HR: 9 INJECTION, SOLUTION INTRAVENOUS at 00:05

## 2020-10-28 NOTE — ED TRIAGE NOTES
History of seizures, patient had 4 witnessed seizures today. Patient received 2.5 IV versed by EMS . Altered.  Takes keppra unknown if taking
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
## 2020-10-28 NOTE — ED NOTES
Past Medical History:   Diagnosis Date    Neurological disorder     Seizures ([facility] [address]      No past surgical history on file. 28yo male pt with significant PMH of seizure activity presents to ED for seizures, family reports pt inconsistent with med regimen as ordered, 1 witnessed seizure 2-3mins long struck head when falling, second seizure 45mns later witnessed by EMS, oriented to room and call bell, cardiac and continuous spO2 monitoring initiated, IV started, blood samples collected and sent to lab for analysis, medicated with Geodon IM, CT scan completed, NS bolus and IV Keppra administered as ordered, plan of care reviewed, call bell in reach, side rails up x2, will continue to monitor. 1:50 AM  Provider at bedside for reeval and dispo, mother at bedside, pt to be discharged once fully awake.

## 2020-10-31 LAB — LEVETIRACETAM SERPL-MCNC: 28.5 UG/ML

## 2021-01-29 ENCOUNTER — TELEPHONE (OUTPATIENT)
Dept: NEUROLOGY | Age: 36
End: 2021-01-29

## 2021-01-29 ENCOUNTER — HOSPITAL ENCOUNTER (EMERGENCY)
Age: 36
Discharge: HOME | End: 2021-01-29
Attending: EMERGENCY MEDICINE
Payer: MEDICARE

## 2021-01-29 VITALS
DIASTOLIC BLOOD PRESSURE: 85 MMHG | WEIGHT: 130 LBS | SYSTOLIC BLOOD PRESSURE: 129 MMHG | BODY MASS INDEX: 20.4 KG/M2 | TEMPERATURE: 97.7 F | HEIGHT: 67 IN | HEART RATE: 69 BPM | OXYGEN SATURATION: 100 % | RESPIRATION RATE: 18 BRPM

## 2021-01-29 DIAGNOSIS — R10.13 ABDOMINAL PAIN, EPIGASTRIC: Primary | ICD-10-CM

## 2021-01-29 LAB
ALBUMIN SERPL-MCNC: 4.1 G/DL (ref 3.5–5)
ALBUMIN/GLOB SERPL: 1.3 {RATIO} (ref 1.1–2.2)
ALP SERPL-CCNC: 88 U/L (ref 45–117)
ALT SERPL-CCNC: 30 U/L (ref 12–78)
ANION GAP SERPL CALC-SCNC: 10 MMOL/L (ref 5–15)
AST SERPL W P-5'-P-CCNC: 19 U/L (ref 15–37)
BASOPHILS # BLD: 0 K/UL (ref 0–0.1)
BASOPHILS NFR BLD: 1 % (ref 0–1)
BILIRUB SERPL-MCNC: 0.2 MG/DL (ref 0.2–1)
BUN SERPL-MCNC: 8 MG/DL (ref 6–20)
BUN/CREAT SERPL: 8 (ref 12–20)
CA-I BLD-MCNC: 8.6 MG/DL (ref 8.5–10.1)
CHLORIDE SERPL-SCNC: 98 MMOL/L (ref 97–108)
CO2 SERPL-SCNC: 27 MMOL/L (ref 21–32)
CREAT SERPL-MCNC: 0.99 MG/DL (ref 0.7–1.3)
DIFFERENTIAL METHOD BLD: NORMAL
EOSINOPHIL # BLD: 0.2 K/UL (ref 0–0.4)
EOSINOPHIL NFR BLD: 5 % (ref 0–7)
ERYTHROCYTE [DISTWIDTH] IN BLOOD BY AUTOMATED COUNT: 11.9 % (ref 11.5–14.5)
GLOBULIN SER CALC-MCNC: 3.2 G/DL (ref 2–4)
GLUCOSE SERPL-MCNC: 96 MG/DL (ref 65–100)
HCT VFR BLD AUTO: 42.6 % (ref 36.6–50.3)
HGB BLD-MCNC: 15 G/DL (ref 12.1–17)
IMM GRANULOCYTES # BLD AUTO: 0 K/UL (ref 0–0.04)
IMM GRANULOCYTES NFR BLD AUTO: 0 % (ref 0–0.5)
LIPASE SERPL-CCNC: 270 U/L (ref 73–393)
LYMPHOCYTES # BLD: 1.6 K/UL (ref 0.8–3.5)
LYMPHOCYTES NFR BLD: 36 % (ref 12–49)
MCH RBC QN AUTO: 31.3 PG (ref 26–34)
MCHC RBC AUTO-ENTMCNC: 35.2 G/DL (ref 30–36.5)
MCV RBC AUTO: 88.9 FL (ref 80–99)
MONOCYTES # BLD: 0.6 K/UL (ref 0–1)
MONOCYTES NFR BLD: 12 % (ref 5–13)
NEUTS SEG # BLD: 2.1 K/UL (ref 1.8–8)
NEUTS SEG NFR BLD: 46 % (ref 32–75)
PLATELET # BLD AUTO: 196 K/UL (ref 150–400)
PMV BLD AUTO: 9.7 FL (ref 8.9–12.9)
POTASSIUM SERPL-SCNC: 3.6 MMOL/L (ref 3.5–5.1)
PROT SERPL-MCNC: 7.3 G/DL (ref 6.4–8.2)
RBC # BLD AUTO: 4.79 M/UL (ref 4.1–5.7)
SODIUM SERPL-SCNC: 135 MMOL/L (ref 136–145)
WBC # BLD AUTO: 4.5 K/UL (ref 4.1–11.1)

## 2021-01-29 PROCEDURE — 80053 COMPREHEN METABOLIC PANEL: CPT

## 2021-01-29 PROCEDURE — 36415 COLL VENOUS BLD VENIPUNCTURE: CPT

## 2021-01-29 PROCEDURE — 83690 ASSAY OF LIPASE: CPT

## 2021-01-29 PROCEDURE — 85025 COMPLETE CBC W/AUTO DIFF WBC: CPT

## 2021-01-29 PROCEDURE — 99283 EMERGENCY DEPT VISIT LOW MDM: CPT

## 2021-01-29 NOTE — ED TRIAGE NOTES
Hx of seizures; started today after eating [employer] with abdominal cramping with vomiting that is better after zofran by ems

## 2021-02-03 NOTE — ED PROVIDER NOTES
EMERGENCY DEPARTMENT HISTORY AND PHYSICAL EXAM      Date: 1/29/2021  Patient Name: Rukhsana Marsh    History of Presenting Illness     Chief Complaint   Patient presents with    Abdominal Pain    Vomiting    Headache       History Provided By: Patient and EMS    HPI: Rukhsana Marsh, 38 y.o. male with a past medical history significant No significant past medical history presents to the ED with cc of abdominal pain after eating at [employer]. Pt came by EMS after acute onset of pain while eating [employer]. Pain is non-radiating. He denies NVD. Pain at present improved significantly. There are no other complaints, changes, or physical findings at this time. PCP: Other, MD Dain    No current facility-administered medications on file prior to encounter. Current Outpatient Medications on File Prior to Encounter   Medication Sig Dispense Refill    levETIRAcetam (KEPPRA XR) 500 mg ER tablet Take 6 Tabs by mouth nightly. 180 Tab 5    melatonin 10 mg tab Take 1 Tab by mouth nightly. 30 Tab 5    eslicarbazepine (Aptiom) 800 mg tab tablet take 2 tablets by mouth every evening 60 Tab 5       Past History     Past Medical History:  Past Medical History:   Diagnosis Date    Neurological disorder     Seizures ([facility] [address]        Past Surgical History:  History reviewed. No pertinent surgical history. Family History:  History reviewed. No pertinent family history. Social History:  Social History     Tobacco Use    Smoking status: Current Every Day Smoker     Packs/day: 1.00     Years: 10.00     Pack years: 10.00    Smokeless tobacco: Never Used   Substance Use Topics    Alcohol use: Yes    Drug use: No       Allergies: Allergies   Allergen Reactions    Penicillins Rash         Review of Systems     Review of Systems   Constitutional: Negative for chills and fever. HENT: Negative. Negative for congestion, rhinorrhea, sneezing and sore throat. Eyes: Negative. Negative for redness and visual disturbance. Respiratory: Negative. Negative for cough, shortness of breath and wheezing. Cardiovascular: Negative. Negative for chest pain and leg swelling. Gastrointestinal: Positive for abdominal pain. Negative for diarrhea, nausea and vomiting. Genitourinary: Negative. Negative for difficulty urinating, discharge and frequency. Musculoskeletal: Negative. Negative for arthralgias, back pain, myalgias and neck stiffness. Skin: Negative. Negative for color change and rash. Neurological: Negative. Negative for dizziness, syncope, weakness, numbness and headaches. Hematological: Negative for adenopathy. Psychiatric/Behavioral: Negative. All other systems reviewed and are negative. Physical Exam     Physical Exam  Vitals signs and nursing note reviewed. HENT:      Head: Atraumatic. Cardiovascular:      Rate and Rhythm: Normal rate and regular rhythm. Heart sounds: Normal heart sounds. No murmur. No friction rub. No gallop. Pulmonary:      Effort: Pulmonary effort is normal. No respiratory distress. Breath sounds: Normal breath sounds. No wheezing or rales. Chest:      Chest wall: No tenderness. Abdominal:      General: Bowel sounds are normal.      Palpations: Abdomen is soft. There is no mass. Tenderness: There is no abdominal tenderness. There is no guarding or rebound. Comments: V mild ttp epigastric region   Musculoskeletal: Normal range of motion. General: No tenderness. Neurological:      Mental Status: He is alert and oriented to person, place, and time. Lab and Diagnostic Study Results     Labs -UNREMARKABLE, reviewed      Medical Decision Making   - I am the first provider for this patient. - I reviewed the vital signs, available nursing notes, past medical history, past surgical history, family history and social history. - Initial assessment performed.  The patients presenting problems have been discussed, and they are in agreement with the care plan formulated and outlined with them. I have encouraged them to ask questions as they arise throughout their visit. Vital Signs-Reviewed the patient's vital signs. Records Reviewed: Nursing Notes    The patient presents with abdominal pain with a differential diagnosis of PUD, esophageal spasm, pancreaittis, gallstones, lower suspicion for ruptured AAA as pain resolved by assessment. ED Course:   Pt had full resolution of symptoms prior to lab work resulting. MDM       Procedures   Medical Decision Makingedical Decision Making        Disposition   Disposition: Condition improved    Discharged    DISCHARGE PLAN:  1. Cannot display discharge medications since this patient is not currently admitted. 2.   Follow-up Information     Follow up With Specialties Details Why [address]  In 2 days As needed [address] [id number] [phone]    [address] [facility] Emergency Medicine  As needed, If symptoms worsen [address]  [phone]        3. Return to ED if worse   4. Discharge Medication List as of 1/29/2021  4:45 PM            Diagnosis     Clinical Impression:   1. Abdominal pain, epigastric        Attestations:    Stephen Barfield MD    Please note that this dictation was completed with [employer], the computer voice recognition software. Quite often unanticipated grammatical, syntax, homophones, and other interpretive errors are inadvertently transcribed by the computer software. Please disregard these errors. Please excuse any errors that have escaped final proofreading. Thank you.

## 2021-03-20 LAB
ALBUMIN SERPL-MCNC: 4.9 G/DL (ref 4–5)
ALBUMIN/GLOB SERPL: 2 {RATIO} (ref 1.2–2.2)
ALP SERPL-CCNC: 108 IU/L (ref 39–117)
ALT SERPL-CCNC: 19 IU/L (ref 0–44)
AST SERPL-CCNC: 18 IU/L (ref 0–40)
BASOPHILS # BLD AUTO: 0.1 X10E3/UL (ref 0–0.2)
BASOPHILS NFR BLD AUTO: 2 %
BILIRUB SERPL-MCNC: 0.3 MG/DL (ref 0–1.2)
BUN SERPL-MCNC: 7 MG/DL (ref 6–20)
BUN/CREAT SERPL: 6 (ref 9–20)
CALCIUM SERPL-MCNC: 9.9 MG/DL (ref 8.7–10.2)
CHLORIDE SERPL-SCNC: 104 MMOL/L (ref 96–106)
CO2 SERPL-SCNC: 24 MMOL/L (ref 20–29)
CREAT SERPL-MCNC: 1.09 MG/DL (ref 0.76–1.27)
EOSINOPHIL # BLD AUTO: 0.2 X10E3/UL (ref 0–0.4)
EOSINOPHIL NFR BLD AUTO: 5 %
ERYTHROCYTE [DISTWIDTH] IN BLOOD BY AUTOMATED COUNT: 12.9 % (ref 11.6–15.4)
GLOBULIN SER CALC-MCNC: 2.4 G/DL (ref 1.5–4.5)
GLUCOSE SERPL-MCNC: 87 MG/DL (ref 65–99)
HCT VFR BLD AUTO: 47.3 % (ref 37.5–51)
HGB BLD-MCNC: 16.1 G/DL (ref 13–17.7)
IMM GRANULOCYTES # BLD AUTO: 0 X10E3/UL (ref 0–0.1)
IMM GRANULOCYTES NFR BLD AUTO: 0 %
LEVETIRACETAM SERPL-MCNC: 35.4 UG/ML (ref 10–40)
LYMPHOCYTES # BLD AUTO: 1.5 X10E3/UL (ref 0.7–3.1)
LYMPHOCYTES NFR BLD AUTO: 38 %
MCH RBC QN AUTO: 30.8 PG (ref 26.6–33)
MCHC RBC AUTO-ENTMCNC: 34 G/DL (ref 31.5–35.7)
MCV RBC AUTO: 91 FL (ref 79–97)
MONOCYTES # BLD AUTO: 0.5 X10E3/UL (ref 0.1–0.9)
MONOCYTES NFR BLD AUTO: 13 %
NEUTROPHILS # BLD AUTO: 1.7 X10E3/UL (ref 1.4–7)
NEUTROPHILS NFR BLD AUTO: 42 %
PLATELET # BLD AUTO: 199 X10E3/UL (ref 150–450)
POTASSIUM SERPL-SCNC: 4.3 MMOL/L (ref 3.5–5.2)
PROT SERPL-MCNC: 7.3 G/DL (ref 6–8.5)
RBC # BLD AUTO: 5.22 X10E6/UL (ref 4.14–5.8)
SODIUM SERPL-SCNC: 143 MMOL/L (ref 134–144)
WBC # BLD AUTO: 4 X10E3/UL (ref 3.4–10.8)

## 2021-04-04 DIAGNOSIS — G40.109 PARTIAL EPILEPSY (HCC): ICD-10-CM

## 2021-04-05 RX ORDER — ESLICARBAZEPINE ACETATE 800 MG/1
TABLET ORAL
Qty: 180 TAB | Refills: 1 | Status: SHIPPED | OUTPATIENT
Start: 2021-04-05 | End: 2021-09-10

## 2021-04-12 RX ORDER — LEVETIRACETAM 500 MG/1
TABLET, EXTENDED RELEASE ORAL
Qty: 540 TAB | Refills: 1 | Status: SHIPPED | OUTPATIENT
Start: 2021-04-12 | End: 2021-09-09

## 2021-04-13 ENCOUNTER — OFFICE VISIT (OUTPATIENT)
Dept: NEUROLOGY | Age: 36
End: 2021-04-13
Payer: MEDICARE

## 2021-04-13 VITALS
WEIGHT: 123 LBS | RESPIRATION RATE: 16 BRPM | HEIGHT: 67 IN | SYSTOLIC BLOOD PRESSURE: 122 MMHG | OXYGEN SATURATION: 100 % | DIASTOLIC BLOOD PRESSURE: 78 MMHG | TEMPERATURE: 97.7 F | BODY MASS INDEX: 19.3 KG/M2 | HEART RATE: 79 BPM

## 2021-04-13 DIAGNOSIS — G40.109 PARTIAL EPILEPSY (HCC): Primary | ICD-10-CM

## 2021-04-13 PROCEDURE — G8427 DOCREV CUR MEDS BY ELIG CLIN: HCPCS | Performed by: NURSE PRACTITIONER

## 2021-04-13 PROCEDURE — G8420 CALC BMI NORM PARAMETERS: HCPCS | Performed by: NURSE PRACTITIONER

## 2021-04-13 PROCEDURE — 99214 OFFICE O/P EST MOD 30 MIN: CPT | Performed by: NURSE PRACTITIONER

## 2021-04-13 PROCEDURE — G8432 DEP SCR NOT DOC, RNG: HCPCS | Performed by: NURSE PRACTITIONER

## 2021-04-13 RX ORDER — DIAZEPAM 10 MG/100UL
1 SPRAY NASAL
Qty: 2 EACH | Refills: 5 | Status: SHIPPED | OUTPATIENT
Start: 2021-04-13

## 2021-04-13 NOTE — PROGRESS NOTES
Dannielle Cason is a 38 y.o. male  Chief Complaint   Patient presents with    Follow-up     6 month    Epilepsy     Health Maintenance Due   Topic Date Due    Hepatitis C Screening  Never done    Pneumococcal 0-64 years (1 of 1 - PPSV23) Never done    COVID-19 Vaccine (1) Never done    DTaP/Tdap/Td series (1 - Tdap) Never done    Medicare Yearly Exam  Never done     Visit Vitals  /78   Pulse 79   Temp 97.7 °F (36.5 °C)   Resp 16   Ht 5' 7\" (1.702 m)   Wt 123 lb (55.8 kg)   SpO2 100%   BMI 19.26 kg/m²     1. Have you been to the ER, urgent care clinic since your last visit? Hospitalized since your last visit? No     2. Have you seen or consulted any other health care providers outside of the [address] [place] [address] since your last visit? Include any pap smears or colon screening. No     Recently has had 2 episodes of epilepsy at girl friends house.

## 2021-04-13 NOTE — PROGRESS NOTES
Eliana Burrell is a 38 y.o. male who presents with the following  Chief Complaint   Patient presents with    Follow-up     6 month    Epilepsy       HPI    FU for partial epilepsy. Has had a few breakthrough seizures due to missing medications. No changes in the seizure activity. All at night. At his girlfriends house because he forgets to bring his medications. Recently last one was in January and went to [facility]. He is back on Keppra XR 3000 and Aptiom 1600 mg   No problems with side effects  No memory or cognitive concerns. No other changes or concerns. Is living with mom also. she has not noticed anything               Allergies   Allergen Reactions    Penicillins Rash       Current Outpatient Medications   Medication Sig    diazePAM (Valtoco) 20 mg/2 spray (10mg/0.1mL x2) spry 1 Spray by Nasal route daily as needed (intractable seizure).  levETIRAcetam (KEPPRA XR) 500 mg ER tablet TAKE 6 TABLETS BY MOUTH NIGHTLY    eslicarbazepine (Aptiom) 800 mg tab tablet TAKE 2 TABLETS BY MOUTH EVERY MORNING    melatonin 10 mg tab Take 1 Tab by mouth nightly. No current facility-administered medications for this visit. Social History     Tobacco Use   Smoking Status Current Every Day Smoker    Packs/day: 1.00    Years: 10.00    Pack years: 10.00   Smokeless Tobacco Never Used       Past Medical History:   Diagnosis Date    Neurological disorder     Seizures ([address]        No past surgical history on file. History reviewed. No pertinent family history. Social History     Socioeconomic History    Marital status: SINGLE     Spouse name: Not on file    Number of children: Not on file    Years of education: Not on file    Highest education level: Not on file   Tobacco Use    Smoking status: Current Every Day Smoker     Packs/day: 1.00     Years: 10.00     Pack years: 10.00    Smokeless tobacco: Never Used   Substance and Sexual Activity    Alcohol use:  Yes    Drug use: No    Sexual activity: Yes       Review of Systems   Eyes: Negative for blurred vision, double vision and photophobia. Gastrointestinal: Negative for nausea and vomiting. Neurological: Positive for seizures and loss of consciousness. Negative for dizziness, focal weakness, weakness and headaches. Remainder of comprehensive review is negative. Physical Exam :    Visit Vitals  /78   Pulse 79   Temp 97.7 °F (36.5 °C)   Resp 16   Ht 5' 7\" (1.702 m)   Wt 55.8 kg (123 lb)   SpO2 100%   BMI 19.26 kg/m²       General: Well defined, nourished, and groomed individual in no acute distress.    Neck: Supple, nontender, no bruits, no pain with resistance to active range of motion.    Heart: Regular rate and rhythm, no murmurs, rub, or gallop. Normal S1S2. Lungs: Clear to auscultation bilaterally with equal chest expansion, no cough, no wheeze  Musculoskeletal: Extremities revealed no edema and had full range of motion of joints.    Psych: Good mood and bright affect    NEUROLOGICAL EXAMINATION:    Mental Status: Alert and oriented to person, place, and time    Cranial Nerves:    II, III, IV, VI: Visual acuity grossly intact. Visual fields are normal.    Pupils are equal, round, and reactive to light and accommodation.    Extra-ocular movements are full and fluid. Fundoscopic exam was benign, no ptosis or nystagmus.    V-XII: Hearing is grossly intact. Facial features are symmetric, with normal sensation and strength. The palate rises symmetrically and the tongue protrudes midline. Sternocleidomastoids 5/5. Motor Examination: Normal tone, bulk, and strength, 5/5 muscle strength throughout. Coordination: Finger to nose was normal. No resting or intention tremor    Gait and Station: Steady while walking. Normal arm swing. No pronator drift. No muscle wasting or fasiculations noted. Reflexes: DTRs 2+ throughout.         Results for orders placed or performed during the hospital encounter of 01/29/21   CBC WITH AUTOMATED DIFF   Result Value Ref Range    WBC 4.5 4.1 - 11.1 K/uL    RBC 4.79 4.10 - 5.70 M/uL    HGB 15.0 12.1 - 17.0 g/dL    HCT 42.6 36.6 - 50.3 %    MCV 88.9 80.0 - 99.0 FL    MCH 31.3 26.0 - 34.0 PG    MCHC 35.2 30.0 - 36.5 g/dL    RDW 11.9 11.5 - 14.5 %    PLATELET 164 948 - 768 K/uL    MPV 9.7 8.9 - 12.9 FL    NEUTROPHILS 46 32 - 75 %    LYMPHOCYTES 36 12 - 49 %    MONOCYTES 12 5 - 13 %    EOSINOPHILS 5 0 - 7 %    BASOPHILS 1 0 - 1 %    IMMATURE GRANULOCYTES 0 0.0 - 0.5 %    ABS. NEUTROPHILS 2.1 1.8 - 8.0 K/UL    ABS. LYMPHOCYTES 1.6 0.8 - 3.5 K/UL    ABS. MONOCYTES 0.6 0.0 - 1.0 K/UL    ABS. EOSINOPHILS 0.2 0.0 - 0.4 K/UL    ABS. BASOPHILS 0.0 0.0 - 0.1 K/UL    ABS. IMM. GRANS. 0.0 0.00 - 0.04 K/UL    DF AUTOMATED     METABOLIC PANEL, COMPREHENSIVE   Result Value Ref Range    Sodium 135 (L) 136 - 145 mmol/L    Potassium 3.6 3.5 - 5.1 mmol/L    Chloride 98 97 - 108 mmol/L    CO2 27 21 - 32 mmol/L    Anion gap 10 5 - 15 mmol/L    Glucose 96 65 - 100 mg/dL    BUN 8 6 - 20 mg/dL    Creatinine 0.99 0.70 - 1.30 mg/dL    BUN/Creatinine ratio 8 (L) 12 - 20      GFR est AA >60 >60 ml/min/1.73m2    GFR est non-AA >60 >60 ml/min/1.73m2    Calcium 8.6 8.5 - 10.1 mg/dL    Bilirubin, total 0.2 0.2 - 1.0 mg/dL    AST (SGOT) 19 15 - 37 U/L    ALT (SGPT) 30 12 - 78 U/L    Alk. phosphatase 88 45 - 117 U/L    Protein, total 7.3 6.4 - 8.2 g/dL    Albumin 4.1 3.5 - 5.0 g/dL    Globulin 3.2 2.0 - 4.0 g/dL    A-G Ratio 1.3 1.1 - 2.2     LIPASE   Result Value Ref Range    Lipase 270 73 - 393 U/L       Orders Placed This Encounter    diazePAM (Valtoco) 20 mg/2 spray (10mg/0.1mL x2) spry     Si Spray by Nasal route daily as needed (intractable seizure). Dispense:  2 Each     Refill:  5       1. Partial epilepsy ([address]        Partial epilepsy and breakthrough seizures due to missing Keppra XR. Keep on Aptiom 1600 mg and Keppra XR 3000 mg daily.    He is going to PCP soon to get blood and will evaluate this further  He will get Valtoco for PRN rescue of intractable seizure. Discussed this in full. No other concerns.                This note will not be viewable in [employer]

## 2021-09-09 DIAGNOSIS — G40.109 PARTIAL EPILEPSY (HCC): ICD-10-CM

## 2021-09-09 RX ORDER — LEVETIRACETAM 500 MG/1
TABLET, EXTENDED RELEASE ORAL
Qty: 540 TABLET | Refills: 1 | Status: SHIPPED | OUTPATIENT
Start: 2021-09-09 | End: 2021-10-12 | Stop reason: SDUPTHER

## 2021-09-10 ENCOUNTER — HOSPITAL ENCOUNTER (EMERGENCY)
Age: 36
Discharge: HOME | End: 2021-09-10
Payer: MEDICARE

## 2021-09-10 VITALS
OXYGEN SATURATION: 98 % | HEIGHT: 65 IN | DIASTOLIC BLOOD PRESSURE: 68 MMHG | BODY MASS INDEX: 20.83 KG/M2 | RESPIRATION RATE: 18 BRPM | SYSTOLIC BLOOD PRESSURE: 113 MMHG | HEART RATE: 70 BPM | WEIGHT: 125 LBS | TEMPERATURE: 98.9 F

## 2021-09-10 DIAGNOSIS — Z20.822 CONTACT WITH AND (SUSPECTED) EXPOSURE TO COVID-19: Primary | ICD-10-CM

## 2021-09-10 LAB — SARS-COV-2, COV2: NORMAL

## 2021-09-10 PROCEDURE — 99282 EMERGENCY DEPT VISIT SF MDM: CPT

## 2021-09-10 PROCEDURE — U0005 INFEC AGEN DETEC AMPLI PROBE: HCPCS

## 2021-09-10 RX ORDER — ESLICARBAZEPINE ACETATE 800 MG/1
TABLET ORAL
Qty: 180 TABLET | Refills: 1 | Status: SHIPPED | OUTPATIENT
Start: 2021-09-10 | End: 2021-10-12 | Stop reason: SDUPTHER

## 2021-09-10 NOTE — ED PROVIDER NOTES
EMERGENCY DEPARTMENT HISTORY AND PHYSICAL EXAM      Date: 9/10/2021  Patient Name: Dino Teague    History of Presenting Illness     Chief Complaint   Patient presents with    Covid Testing       History Provided By: Patient and Patient's Mother    HPI: Dino Teague, 38 y.o. male with a past medical history significant seizure presents to the ED with cc of positive Covid exposure. Per the patient and his mother he had a family member with a positive Covid test result and notified them on Monday of this week, 4 days ago they were positive. He has had multiple exposures to this person. He specifically denies fever, chills, cough, body aches, loss of taste, loss of smell. He denies any recent illnesses. He denies any recent seizures and reports compliance with his seizure medications. He and his mother would like him tested due to the positive exposure. There are no other complaints, changes, or physical findings at this time. PCP: Other, MD Dain    No current facility-administered medications on file prior to encounter. Current Outpatient Medications on File Prior to Encounter   Medication Sig Dispense Refill    eslicarbazepine (Aptiom) 800 mg tab tablet TAKE 2 TABLETS BY MOUTH EVERY DAY IN THE MORNING 180 Tablet 1    levETIRAcetam (KEPPRA XR) 500 mg ER tablet TAKE 6 TABLETS BY MOUTH NIGHTLY 540 Tablet 1    diazePAM (Valtoco) 20 mg/2 spray (10mg/0.1mL x2) spry 1 Spray by Nasal route daily as needed (intractable seizure). 2 Each 5    [DISCONTINUED] eslicarbazepine (Aptiom) 800 mg tab tablet TAKE 2 TABLETS BY MOUTH EVERY MORNING 180 Tab 1    melatonin 10 mg tab Take 1 Tab by mouth nightly. 30 Tab 5       Past History     Past Medical History:  Past Medical History:   Diagnosis Date    Neurological disorder     [address] [address] [facility]        Past Surgical History:  History reviewed. No pertinent surgical history. Family History:  History reviewed. No pertinent family history.     Social History:  Social History     Tobacco Use    Smoking status: Current Every Day Smoker     Packs/day: 1.00     Years: 10.00     Pack years: 10.00    Smokeless tobacco: Never Used   Substance Use Topics    Alcohol use: Yes    Drug use: No       Allergies: Allergies   Allergen Reactions    Penicillins Rash         Review of Systems     Review of Systems   Constitutional: Negative for activity change, appetite change, chills, fatigue and fever. HENT: Negative for congestion, ear pain, hearing loss, postnasal drip, rhinorrhea, sinus pressure, sinus pain, sneezing, sore throat, tinnitus and trouble swallowing. Eyes: Negative for discharge and visual disturbance. Respiratory: Negative for cough, chest tightness, shortness of breath and wheezing. Cardiovascular: Negative for chest pain, palpitations and leg swelling. Gastrointestinal: Negative for abdominal distention, abdominal pain, blood in stool, constipation, diarrhea, nausea and vomiting. Endocrine: Negative. Genitourinary: Negative for dysuria, frequency and urgency. Musculoskeletal: Negative for back pain, myalgias, neck pain and neck stiffness. Skin: Negative for rash. Allergic/Immunologic: Negative. Neurological: Negative for dizziness, seizures, syncope, weakness and headaches. Hematological: Does not bruise/bleed easily. Psychiatric/Behavioral: Negative for confusion, hallucinations, sleep disturbance and suicidal ideas. The patient is not nervous/anxious. All other systems reviewed and are negative. Physical Exam     Physical Exam  Vitals and nursing note reviewed. Constitutional:       General: He is not in acute distress. Appearance: Normal appearance. He is well-developed and normal weight. He is not ill-appearing or toxic-appearing. HENT:      Head: Normocephalic and atraumatic.       Right Ear: External ear normal.      Left Ear: External ear normal.      Nose: Nose normal.      Mouth/Throat:      Pharynx: Oropharynx is clear. Eyes:      Extraocular Movements: Extraocular movements intact. Conjunctiva/sclera: Conjunctivae normal.      Pupils: Pupils are equal, round, and reactive to light. Neck:      Vascular: No JVD. Trachea: No tracheal deviation. Cardiovascular:      Rate and Rhythm: Normal rate and regular rhythm. No extrasystoles are present. Pulses:           Radial pulses are 2+ on the right side and 2+ on the left side. Heart sounds: Normal heart sounds. No murmur heard. Pulmonary:      Effort: Pulmonary effort is normal. No respiratory distress. Breath sounds: Normal breath sounds. No wheezing, rhonchi or rales. Chest:      Chest wall: No tenderness. Abdominal:      General: Bowel sounds are normal.      Palpations: Abdomen is soft. There is no mass. Tenderness: There is no abdominal tenderness. There is no right CVA tenderness, left CVA tenderness, guarding or rebound. Musculoskeletal:         General: No tenderness. Normal range of motion. Cervical back: Normal range of motion and neck supple. No rigidity or tenderness. Right lower leg: No tenderness. No edema. Left lower leg: No tenderness. No edema. Skin:     General: Skin is warm and dry. Capillary Refill: Capillary refill takes less than 2 seconds. Coloration: Skin is not jaundiced. Findings: No erythema. Neurological:      General: No focal deficit present. Mental Status: He is alert and oriented to person, place, and time. Cranial Nerves: No cranial nerve deficit. Sensory: No sensory deficit. Motor: No weakness. Gait: Gait normal.   Psychiatric:         Mood and Affect: Mood normal.         Behavior: Behavior normal.         Thought Content: Thought content normal.         Judgment: Judgment normal.         Lab and Diagnostic Study Results     Labs -   No results found for this or any previous visit (from the past 12 hour(s)).     Radiologic Studies -   @lastxrresult@  CT Results  (Last 48 hours)    None        CXR Results  (Last 48 hours)    None            Medical Decision Making   - I am the first provider for this patient. - I reviewed the vital signs, available nursing notes, past medical history, past surgical history, family history and social history. - Initial assessment performed. The patients presenting problems have been discussed, and they are in agreement with the care plan formulated and outlined with them. I have encouraged them to ask questions as they arise throughout their visit. Vital Signs-Reviewed the patient's vital signs. Patient Vitals for the past 12 hrs:   Temp Pulse Resp BP SpO2   09/10/21 1412 98.9 °F (37.2 °C) 70 18 113/68 98 %       Records Reviewed: Nursing Notes    The patient presents with positive Covid exposure with a differential diagnosis of Covid infection, Covid exposure, viral illness, upper respiratory infection      ED Course:   Patient was seen, interviewed, assessed. Covid test ordered. Educated patient and mother on Covid testing, result availability, Covid precautions. He remained stable throughout stay in the ER and was discharged without incident       Provider Notes (Medical Decision Making):     MDM  Number of Diagnoses or Management Options  Diagnosis management comments: Patient will be tested for Covid given positive exposure. Given no active symptoms or no other complaints, will discharge with Covid education. Disposition   Disposition: DC- Adult Discharges: All of the diagnostic tests were reviewed and questions answered. Diagnosis, care plan and treatment options were discussed. The patient understands the instructions and will follow up as directed. The patients results have been reviewed with them. They have been counseled regarding their diagnosis.   The patient and parent verbally convey understanding and agreement of the signs, symptoms, diagnosis, treatment and prognosis and additionally agrees to follow up as recommended with their PCP in 24 - 48 hours. They also agree with the care-plan and convey that all of their questions have been answered. I have also put together some discharge instructions for them that include: 1) educational information regarding their diagnosis, 2) how to care for their diagnosis at home, as well a 3) list of reasons why they would want to return to the ED prior to their follow-up appointment, should their condition change. Discharged    DISCHARGE PLAN:  1. Current Discharge Medication List      CONTINUE these medications which have NOT CHANGED    Details   eslicarbazepine (Aptiom) 800 mg tab tablet TAKE 2 TABLETS BY MOUTH EVERY DAY IN THE MORNING  Qty: 180 Tablet, Refills: 1    Comments: DX Code Needed  NO REFILLS. Associated Diagnoses: Partial epilepsy (HCC)      levETIRAcetam (KEPPRA XR) 500 mg ER tablet TAKE 6 TABLETS BY MOUTH NIGHTLY  Qty: 540 Tablet, Refills: 1      diazePAM (Valtoco) 20 mg/2 spray (10mg/0.1mL x2) spry 1 Spray by Nasal route daily as needed (intractable seizure). Qty: 2 Each, Refills: 5    Associated Diagnoses: Partial epilepsy (HCC)      melatonin 10 mg tab Take 1 Tab by mouth nightly. Qty: 30 Tab, Refills: 5           2. Follow-up Information     Follow up With Specialties Details Why Contact Info    Follow up with primary care, urgent care, or this Emergency department   As needed     [facility]   PCP referral [address] [place] [address]  [phone]        3. Return to ED if worse   4. Current Discharge Medication List            Diagnosis     Clinical Impression:   1. Contact with and (suspected) exposure to covid-19        Attestations:    Nevin Palomo NP    Please note that this dictation was completed with [employer], the [employer] voice recognition software.   Quite often unanticipated grammatical, syntax, homophones, and other interpretive errors are inadvertently transcribed by the computer software. Please disregard these errors. Please excuse any errors that have escaped final proofreading. Thank you.

## 2021-09-11 LAB
SARS-COV-2, XPLCVT: NOT DETECTED
SOURCE, COVRS: NORMAL

## 2021-10-12 ENCOUNTER — OFFICE VISIT (OUTPATIENT)
Dept: NEUROLOGY | Age: 36
End: 2021-10-12
Payer: MEDICARE

## 2021-10-12 VITALS
WEIGHT: 127.4 LBS | BODY MASS INDEX: 20 KG/M2 | HEIGHT: 67 IN | SYSTOLIC BLOOD PRESSURE: 94 MMHG | TEMPERATURE: 97.4 F | HEART RATE: 70 BPM | OXYGEN SATURATION: 98 % | DIASTOLIC BLOOD PRESSURE: 60 MMHG

## 2021-10-12 DIAGNOSIS — G40.109 PARTIAL EPILEPSY (HCC): ICD-10-CM

## 2021-10-12 PROCEDURE — G8432 DEP SCR NOT DOC, RNG: HCPCS | Performed by: NURSE PRACTITIONER

## 2021-10-12 PROCEDURE — G8427 DOCREV CUR MEDS BY ELIG CLIN: HCPCS | Performed by: NURSE PRACTITIONER

## 2021-10-12 PROCEDURE — G8420 CALC BMI NORM PARAMETERS: HCPCS | Performed by: NURSE PRACTITIONER

## 2021-10-12 PROCEDURE — 99214 OFFICE O/P EST MOD 30 MIN: CPT | Performed by: NURSE PRACTITIONER

## 2021-10-12 RX ORDER — ESLICARBAZEPINE ACETATE 800 MG/1
TABLET ORAL
Qty: 180 TABLET | Refills: 1 | Status: SHIPPED | OUTPATIENT
Start: 2021-10-12 | End: 2022-02-08 | Stop reason: SDUPTHER

## 2021-10-12 RX ORDER — LEVETIRACETAM 500 MG/1
TABLET, EXTENDED RELEASE ORAL
Qty: 540 TABLET | Refills: 1 | Status: SHIPPED | OUTPATIENT
Start: 2021-10-12 | End: 2022-02-14 | Stop reason: SDUPTHER

## 2021-10-12 NOTE — PROGRESS NOTES
Jenn Root is a 38 y.o. 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 No seizures since last visit. Recently last one was in January and went to [facility]. Keppra XR 3000 and Aptiom 1600 mg   No problems with side effects  No memory or cognitive concerns. Mother and girlfriend help him remember taking them   No recent blood taken   No falls. No other changes or concerns. Is living with mom     Allergies   Allergen Reactions    Penicillins Rash       Current Outpatient Medications   Medication Sig    eslicarbazepine (Aptiom) 800 mg tab tablet TAKE 2 TABLETS BY MOUTH EVERY DAY IN THE MORNING    levETIRAcetam (KEPPRA XR) 500 mg ER tablet TAKE 6 TABLETS BY MOUTH NIGHTLY    diazePAM (Valtoco) 20 mg/2 spray (10mg/0.1mL x2) spry 1 Spray by Nasal route daily as needed (intractable seizure).  melatonin 10 mg tab Take 1 Tab by mouth nightly. No current facility-administered medications for this visit. Social History     Tobacco Use   Smoking Status Current Every Day Smoker    Packs/day: 1.00    Years: 10.00    Pack years: 10.00   Smokeless Tobacco Never Used       Past Medical History:   Diagnosis Date    Neurological disorder     Seizures ([address]        No past surgical history on file. No family history on file. Social History     Socioeconomic History    Marital status: SINGLE     Spouse name: Not on file    Number of children: Not on file    Years of education: Not on file    Highest education level: Not on file   Tobacco Use    Smoking status: Current Every Day Smoker     Packs/day: 1.00     Years: 10.00     Pack years: 10.00    Smokeless tobacco: Never Used   Substance and Sexual Activity    Alcohol use:  Yes    Drug use: No    Sexual activity: Yes     Social Determinants of Health     Financial Resource Strain:     Difficulty of Paying Living Expenses:    Food Insecurity:     Worried About Running Out of Food in the Last Year:     Nikita of Food in the Last Year:    Transportation Needs:     Lack of Transportation (Medical):  Lack of Transportation (Non-Medical):    Physical Activity:     Days of Exercise per Week:     Minutes of Exercise per Session:    Stress:     Feeling of Stress :    Social Connections:     Frequency of Communication with Friends and Family:     Frequency of Social Gatherings with Friends and Family:     Attends [employer] Services:     Active Member of Clubs or Organizations:     Attends Club or Organization Meetings:     Marital Status:        Review of Systems   Eyes: Negative for blurred vision, double vision and photophobia. Respiratory: Negative for shortness of breath and wheezing. Cardiovascular: Negative for chest pain and palpitations. Gastrointestinal: Negative for nausea and vomiting. Neurological: Negative for seizures and loss of consciousness. Psychiatric/Behavioral: Negative for depression, hallucinations, memory loss, substance abuse and suicidal ideas. The patient is not nervous/anxious and does not have insomnia. Remainder of comprehensive review is negative. Physical Exam :    Visit Vitals  BP 94/60   Pulse 70   Temp 97.4 °F (36.3 °C)   Ht 5' 7\" (1.702 m)   Wt 57.8 kg (127 lb 6.4 oz) Comment: boots on   SpO2 98%   BMI 19.95 kg/m²       General: Well defined, nourished, and groomed individual in no acute distress.    Neck: Supple, nontender, no bruits, no pain with resistance to active range of motion.    Musculoskeletal: Extremities revealed no edema and had full range of motion of joints.    Psych: Good mood and bright affect    NEUROLOGICAL EXAMINATION:    Mental Status: Alert and oriented to person, place, and time    Cranial Nerves:    II, III, IV, VI: Visual acuity grossly intact.  Visual fields are normal.    Pupils are equal, round, and reactive to light and accommodation.    Extra-ocular movements are full and fluid. Fundoscopic exam was benign, no ptosis or nystagmus.    V-XII: Hearing is grossly intact. Facial features are symmetric, with normal sensation and strength. The palate rises symmetrically and the tongue protrudes midline. Sternocleidomastoids 5/5. Motor Examination: Normal tone, bulk, and strength, 5/5 muscle strength throughout. Coordination: Finger to nose was normal. No resting or intention tremor    Gait and Station: Steady while walking. Normal arm swing. No pronator drift. No muscle wasting or fasiculations noted. Reflexes: DTRs 2+ throughout.           Results for orders placed or performed during the hospital encounter of 09/10/21   SARS-COV-2   Result Value Ref Range    SARS-CoV-2 Please find results under separate order     SARS-COV-2   Result Value Ref Range    Specimen source Nasopharyngeal      SARS-CoV-2 Not detected Not detected       Orders Placed This Encounter    LIPID PANEL     Standing Status:   Future     Number of Occurrences:   1     Standing Expiration Date:   10/12/2022    CBC WITH AUTOMATED DIFF     Standing Status:   Future     Number of Occurrences:   1     Standing Expiration Date:   06/55/8245    METABOLIC PANEL, COMPREHENSIVE     Standing Status:   Future     Number of Occurrences:   1     Standing Expiration Date:   10/12/2022    HEMOGLOBIN A1C WITH EAG     Standing Status:   Future     Number of Occurrences:   1     Standing Expiration Date:   10/12/2022    TSH, 3RD GENERATION     Standing Status:   Future     Number of Occurrences:   1     Standing Expiration Date:   10/12/2022    T3 UPTAKE PROFILE     Standing Status:   Future     Number of Occurrences:   1     Standing Expiration Date:   10/12/2022    T4     Standing Status:   Future     Number of Occurrences:   1     Standing Expiration Date:   72/77/0383    eslicarbazepine (Aptiom) 800 mg tab tablet     Sig: TAKE 2 TABLETS BY MOUTH EVERY DAY IN THE MORNING     Dispense:  180 Tablet     Refill:  1     DX Code Needed  NO REFILLS.  levETIRAcetam (KEPPRA XR) 500 mg ER tablet     Sig: TAKE 6 TABLETS BY MOUTH NIGHTLY     Dispense:  540 Tablet     Refill:  1       1. Partial epilepsy ([address]          Keep Keppra XR and Aptiom for prevention of seizures. Doing well with no concerns   Mother agrees  Will get blood work to evaluate further. Continue to track and stay active. Call with lab results.            This note will not be viewable in [employer]

## 2021-10-13 LAB
ALBUMIN SERPL-MCNC: 5 G/DL (ref 4–5)
ALBUMIN/GLOB SERPL: 2.3 {RATIO} (ref 1.2–2.2)
ALP SERPL-CCNC: 81 IU/L (ref 44–121)
ALT SERPL-CCNC: 32 IU/L (ref 0–44)
AST SERPL-CCNC: 23 IU/L (ref 0–40)
BASOPHILS # BLD AUTO: 0.1 X10E3/UL (ref 0–0.2)
BASOPHILS NFR BLD AUTO: 1 %
BILIRUB SERPL-MCNC: <0.2 MG/DL (ref 0–1.2)
BUN SERPL-MCNC: 5 MG/DL (ref 6–20)
BUN/CREAT SERPL: 5 (ref 9–20)
CALCIUM SERPL-MCNC: 10 MG/DL (ref 8.7–10.2)
CHLORIDE SERPL-SCNC: 100 MMOL/L (ref 96–106)
CHOLEST SERPL-MCNC: 189 MG/DL (ref 100–199)
CO2 SERPL-SCNC: 27 MMOL/L (ref 20–29)
CREAT SERPL-MCNC: 0.91 MG/DL (ref 0.76–1.27)
EOSINOPHIL # BLD AUTO: 0.3 X10E3/UL (ref 0–0.4)
EOSINOPHIL NFR BLD AUTO: 6 %
ERYTHROCYTE [DISTWIDTH] IN BLOOD BY AUTOMATED COUNT: 12.6 % (ref 11.6–15.4)
EST. AVERAGE GLUCOSE BLD GHB EST-MCNC: 103 MG/DL
FT4I SERPL CALC-MCNC: 1.1 (ref 1.2–4.9)
GLOBULIN SER CALC-MCNC: 2.2 G/DL (ref 1.5–4.5)
GLUCOSE SERPL-MCNC: 89 MG/DL (ref 65–99)
HBA1C MFR BLD: 5.2 % (ref 4.8–5.6)
HCT VFR BLD AUTO: 45.8 % (ref 37.5–51)
HDLC SERPL-MCNC: 49 MG/DL
HGB BLD-MCNC: 16 G/DL (ref 13–17.7)
IMM GRANULOCYTES # BLD AUTO: 0 X10E3/UL (ref 0–0.1)
IMM GRANULOCYTES NFR BLD AUTO: 0 %
LDLC SERPL CALC-MCNC: 118 MG/DL (ref 0–99)
LYMPHOCYTES # BLD AUTO: 1.6 X10E3/UL (ref 0.7–3.1)
LYMPHOCYTES NFR BLD AUTO: 37 %
MCH RBC QN AUTO: 32.3 PG (ref 26.6–33)
MCHC RBC AUTO-ENTMCNC: 34.9 G/DL (ref 31.5–35.7)
MCV RBC AUTO: 92 FL (ref 79–97)
MONOCYTES # BLD AUTO: 0.6 X10E3/UL (ref 0.1–0.9)
MONOCYTES NFR BLD AUTO: 14 %
NEUTROPHILS # BLD AUTO: 1.8 X10E3/UL (ref 1.4–7)
NEUTROPHILS NFR BLD AUTO: 42 %
PLATELET # BLD AUTO: 206 X10E3/UL (ref 150–450)
POTASSIUM SERPL-SCNC: 4.8 MMOL/L (ref 3.5–5.2)
PROT SERPL-MCNC: 7.2 G/DL (ref 6–8.5)
RBC # BLD AUTO: 4.96 X10E6/UL (ref 4.14–5.8)
SODIUM SERPL-SCNC: 140 MMOL/L (ref 134–144)
T3RU NFR SERPL: 22 % (ref 24–39)
T4 SERPL-MCNC: 4.9 UG/DL (ref 4.5–12)
TRIGL SERPL-MCNC: 125 MG/DL (ref 0–149)
TSH SERPL DL<=0.005 MIU/L-ACNC: 2.13 UIU/ML (ref 0.45–4.5)
VLDLC SERPL CALC-MCNC: 22 MG/DL (ref 5–40)
WBC # BLD AUTO: 4.4 X10E3/UL (ref 3.4–10.8)

## 2021-10-15 ENCOUNTER — TELEPHONE (OUTPATIENT)
Dept: NEUROLOGY | Age: 36
End: 2021-10-15

## 2021-12-27 ENCOUNTER — HOSPITAL ENCOUNTER (EMERGENCY)
Age: 36
Discharge: HOME | End: 2021-12-27
Attending: STUDENT IN AN ORGANIZED HEALTH CARE EDUCATION/TRAINING PROGRAM
Payer: MEDICARE

## 2021-12-27 VITALS
BODY MASS INDEX: 24.99 KG/M2 | HEIGHT: 65 IN | SYSTOLIC BLOOD PRESSURE: 148 MMHG | OXYGEN SATURATION: 97 % | WEIGHT: 150 LBS | RESPIRATION RATE: 16 BRPM | HEART RATE: 68 BPM | DIASTOLIC BLOOD PRESSURE: 86 MMHG | TEMPERATURE: 97.8 F

## 2021-12-27 DIAGNOSIS — R03.0 ELEVATED BLOOD PRESSURE READING: Primary | ICD-10-CM

## 2021-12-27 DIAGNOSIS — Z20.822 LAB TEST NEGATIVE FOR COVID-19 VIRUS: ICD-10-CM

## 2021-12-27 LAB
COVID-19 RAPID TEST, COVR: NOT DETECTED
SPECIMEN SOURCE: NORMAL

## 2021-12-27 PROCEDURE — 87635 SARS-COV-2 COVID-19 AMP PRB: CPT

## 2021-12-27 PROCEDURE — 99282 EMERGENCY DEPT VISIT SF MDM: CPT

## 2021-12-28 NOTE — ED PROVIDER NOTES
EMERGENCY DEPARTMENT HISTORY AND PHYSICAL EXAM      Date: 12/27/2021  Patient Name: Johnna Oscar      History of Presenting Illness     Chief Complaint   Patient presents with    Covid Testing       History Provided By: Patient and Patient's Mother    HPI: Johnna Oscar, 39 y.o. male with PMH of neurological disorder and seizure who presents for Covid testing. No symptoms suggestive of Covid at this point. While in the waiting room, patient had an episode of shaking of one hand and was combative briefly with return to baseline slowly. Currently, patient is complaining of being tired and does not have any other complaints. Presenting issue of mild severity with no aggravating leaving factors no associate additional symptoms. There are no other complaints, changes, or physical findings at this time. PCP: Marisa, MD Dain    Current Outpatient Medications   Medication Sig Dispense Refill    eslicarbazepine (Aptiom) 800 mg tab tablet TAKE 2 TABLETS BY MOUTH EVERY DAY IN THE MORNING 180 Tablet 1    levETIRAcetam (KEPPRA XR) 500 mg ER tablet TAKE 6 TABLETS BY MOUTH NIGHTLY 540 Tablet 1    diazePAM (Valtoco) 20 mg/2 spray (10mg/0.1mL x2) spry 1 Spray by Nasal route daily as needed (intractable seizure). 2 Each 5    melatonin 10 mg tab Take 1 Tab by mouth nightly. 30 Tab 5       Past History     Past Medical History:  Past Medical History:   Diagnosis Date    Neurological disorder     Seizures ([address]        Past Surgical History:  History reviewed. No pertinent surgical history. Family History:  History reviewed. No pertinent family history. Social History:  Social History     Tobacco Use    Smoking status: Current Some Day Smoker     Packs/day: 0.00     Years: 10.00     Pack years: 0.00    Smokeless tobacco: Never Used   Vaping Use    Vaping Use: Never used   Substance Use Topics    Alcohol use: Yes     Comment: on occasion     Drug use: No       Allergies:   Allergies   Allergen Reactions    Penicillins Rash         Review of Systems     Review of Systems   Constitutional: Negative for activity change, chills and fever. HENT: Negative for congestion and facial swelling. Eyes: Negative for discharge and visual disturbance. Respiratory: Negative for chest tightness and shortness of breath. Cardiovascular: Negative for chest pain and leg swelling. Gastrointestinal: Negative for abdominal pain, diarrhea and vomiting. Genitourinary: Negative for dysuria and flank pain. Musculoskeletal: Negative for back pain and gait problem. Skin: Negative for rash and wound. Neurological: Negative for dizziness, weakness and headaches. Physical Exam     Physical Exam  Constitutional:       General: He is not in acute distress. Appearance: Normal appearance. HENT:      Head: Normocephalic and atraumatic. Eyes:      Extraocular Movements: Extraocular movements intact. Conjunctiva/sclera: Conjunctivae normal.   Cardiovascular:      Rate and Rhythm: Normal rate and regular rhythm. Pulmonary:      Effort: Pulmonary effort is normal.      Breath sounds: Normal breath sounds. Abdominal:      Palpations: Abdomen is soft. Tenderness: There is no abdominal tenderness. Musculoskeletal:         General: No tenderness or deformity. Cervical back: Normal range of motion and neck supple. Skin:     General: Skin is warm and dry. Neurological:      General: No focal deficit present. Mental Status: He is alert and oriented to person, place, and time. Lab and Diagnostic Study Results     Labs -     No results found for this or any previous visit (from the past 12 hour(s)). Radiologic Studies -   [unfilled]  CT Results  (Last 48 hours)    None        CXR Results  (Last 48 hours)    None          Medical Decision Making and ED Course   - I am the first and primary provider for this patient AND AM THE PRIMARY PROVIDER OF RECORD.     - I reviewed the vital signs, available nursing notes, past medical history, past surgical history, family history and social history. - Initial assessment performed. The patients presenting problems have been discussed, and the staff are in agreement with the care plan formulated and outlined with them. I have encouraged them to ask questions as they arise throughout their visit. Vital Signs-Reviewed the patient's vital signs. No data found. Records Reviewed: Nursing Notes    Provider Notes (Medical Decision Making):     #Covid testing: Patient is asymptomatic and was swabbed with rapid Covid testing which came back negative. #Behavioral: Mother reports that the patient had brief episode of shaking of one hand and was combative. She is concerned that he may have had a seizure. My evaluation, patient appeared mildly somnolent but appropriate without focal neurological deficits. Thus, observed in the ED without seizure or any other concerning symptoms and discharged home to continue taking his medications and follow-up with his doctor. Disposition     Disposition: Condition stable  DC- Adult Discharges: All of the diagnostic tests were reviewed and questions answered. Diagnosis, care plan and treatment options were discussed. The patient understands the instructions and will follow up as directed. The patients results have been reviewed with them. They have been counseled regarding their diagnosis. The patient and parent verbally convey understanding and agreement of the signs, symptoms, diagnosis, treatment and prognosis and additionally agrees to follow up as recommended with their PCP in 24 - 48 hours. They also agree with the care-plan and convey that all of their questions have been answered.   I have also put together some discharge instructions for them that include: 1) educational information regarding their diagnosis, 2) how to care for their diagnosis at home, as well a 3) list of reasons why they would want to return to the ED prior to their follow-up appointment, should their condition change. Discharged      DISCHARGE PLAN:  1. Current Discharge Medication List      CONTINUE these medications which have NOT CHANGED    Details   eslicarbazepine (Aptiom) 800 mg tab tablet TAKE 2 TABLETS BY MOUTH EVERY DAY IN THE MORNING  Qty: 180 Tablet, Refills: 1    Comments: DX Code Needed  NO REFILLS. Associated Diagnoses: Partial epilepsy (HCC)      levETIRAcetam (KEPPRA XR) 500 mg ER tablet TAKE 6 TABLETS BY MOUTH NIGHTLY  Qty: 540 Tablet, Refills: 1    Associated Diagnoses: Partial epilepsy (HCC)      diazePAM (Valtoco) 20 mg/2 spray (10mg/0.1mL x2) spry 1 Spray by Nasal route daily as needed (intractable seizure). Qty: 2 Each, Refills: 5    Associated Diagnoses: Partial epilepsy (HCC)      melatonin 10 mg tab Take 1 Tab by mouth nightly. Qty: 30 Tab, Refills: 5           2. Follow-up Information     Follow up With Specialties Details Why [address] EMERGENCY DEPT Emergency Medicine Go to  As needed, If symptoms worsen [address] [place] [address]  [phone]    Primary Care Doctor  Call in 3 days Discuss your visit to the ER         3. Return to ED if worse   4. Discharge Medication List as of 12/27/2021  8:57 PM          Diagnosis     Clinical Impression:   1. Elevated blood pressure reading    2. Lab test negative for COVID-19 virus        Attestations: Medardo Cee MD    Please note that this dictation was completed with [employer], the computer voice recognition software. Quite often unanticipated grammatical, syntax, homophones, and other interpretive errors are inadvertently transcribed by the computer software. Please disregard these errors. Please excuse any errors that have escaped final proofreading. Thank you.

## 2021-12-28 NOTE — ED NOTES
Pt had seizure while waiting in [facility], pt alert at this time. Pt did become combative for a short period. Pt directed to bathroom and then to stretcher. Pt in no distress at this time.

## 2022-02-08 DIAGNOSIS — G40.109 PARTIAL EPILEPSY (HCC): ICD-10-CM

## 2022-02-08 NOTE — TELEPHONE ENCOUNTER
Requested Prescriptions     Pending Prescriptions Disp Refills    eslicarbazepine (Aptiom) 800 mg tab tablet 180 Tablet 1     Sig: TAKE 2 TABLETS BY MOUTH EVERY DAY IN THE MORNING     Pt mother is req refill on Aptiom. Please call back.

## 2022-02-09 RX ORDER — ESLICARBAZEPINE ACETATE 800 MG/1
TABLET ORAL
Qty: 180 TABLET | Refills: 1 | Status: SHIPPED | OUTPATIENT
Start: 2022-02-09 | End: 2022-02-14 | Stop reason: SDUPTHER

## 2022-02-11 ENCOUNTER — HOSPITAL ENCOUNTER (EMERGENCY)
Age: 37
Discharge: HOME | End: 2022-02-11
Attending: STUDENT IN AN ORGANIZED HEALTH CARE EDUCATION/TRAINING PROGRAM
Payer: MEDICARE

## 2022-02-11 ENCOUNTER — APPOINTMENT (OUTPATIENT)
Dept: CT IMAGING | Age: 37
End: 2022-02-11
Attending: STUDENT IN AN ORGANIZED HEALTH CARE EDUCATION/TRAINING PROGRAM
Payer: MEDICARE

## 2022-02-11 VITALS
BODY MASS INDEX: 24.99 KG/M2 | OXYGEN SATURATION: 100 % | WEIGHT: 150 LBS | SYSTOLIC BLOOD PRESSURE: 129 MMHG | DIASTOLIC BLOOD PRESSURE: 83 MMHG | HEART RATE: 64 BPM | TEMPERATURE: 98.4 F | HEIGHT: 65 IN | RESPIRATION RATE: 18 BRPM

## 2022-02-11 DIAGNOSIS — G40.909 SEIZURE DISORDER (HCC): Primary | ICD-10-CM

## 2022-02-11 LAB
ALBUMIN SERPL-MCNC: 3.6 G/DL (ref 3.5–5)
ALBUMIN/GLOB SERPL: 1.2 {RATIO} (ref 1.1–2.2)
ALP SERPL-CCNC: 70 U/L (ref 45–117)
ALT SERPL-CCNC: 13 U/L (ref 12–78)
ANION GAP SERPL CALC-SCNC: 7 MMOL/L (ref 5–15)
AST SERPL W P-5'-P-CCNC: 14 U/L (ref 15–37)
BASOPHILS # BLD: 0.1 K/UL (ref 0–0.1)
BASOPHILS NFR BLD: 2 % (ref 0–1)
BILIRUB SERPL-MCNC: 0.2 MG/DL (ref 0.2–1)
BUN SERPL-MCNC: 7 MG/DL (ref 6–20)
BUN/CREAT SERPL: 8 (ref 12–20)
CA-I BLD-MCNC: 8.2 MG/DL (ref 8.5–10.1)
CHLORIDE SERPL-SCNC: 101 MMOL/L (ref 97–108)
CK SERPL-CCNC: 173 U/L (ref 39–308)
CO2 SERPL-SCNC: 26 MMOL/L (ref 21–32)
CREAT SERPL-MCNC: 0.92 MG/DL (ref 0.7–1.3)
DIFFERENTIAL METHOD BLD: ABNORMAL
EOSINOPHIL # BLD: 0.3 K/UL (ref 0–0.4)
EOSINOPHIL NFR BLD: 8 % (ref 0–7)
ERYTHROCYTE [DISTWIDTH] IN BLOOD BY AUTOMATED COUNT: 11.9 % (ref 11.5–14.5)
ETHANOL SERPL-MCNC: <4 MG/DL
GLOBULIN SER CALC-MCNC: 3.1 G/DL (ref 2–4)
GLUCOSE SERPL-MCNC: 121 MG/DL (ref 65–100)
HCT VFR BLD AUTO: 40.3 % (ref 36.6–50.3)
HGB BLD-MCNC: 13.8 G/DL (ref 12.1–17)
IMM GRANULOCYTES # BLD AUTO: 0 K/UL (ref 0–0.04)
IMM GRANULOCYTES NFR BLD AUTO: 0 % (ref 0–0.5)
LYMPHOCYTES # BLD: 1.4 K/UL (ref 0.8–3.5)
LYMPHOCYTES NFR BLD: 36 % (ref 12–49)
MCH RBC QN AUTO: 31 PG (ref 26–34)
MCHC RBC AUTO-ENTMCNC: 34.2 G/DL (ref 30–36.5)
MCV RBC AUTO: 90.6 FL (ref 80–99)
MONOCYTES # BLD: 0.4 K/UL (ref 0–1)
MONOCYTES NFR BLD: 11 % (ref 5–13)
NEUTS SEG # BLD: 1.7 K/UL (ref 1.8–8)
NEUTS SEG NFR BLD: 43 % (ref 32–75)
PLATELET # BLD AUTO: 167 K/UL (ref 150–400)
PMV BLD AUTO: 10.4 FL (ref 8.9–12.9)
POTASSIUM SERPL-SCNC: 3.5 MMOL/L (ref 3.5–5.1)
PROT SERPL-MCNC: 6.7 G/DL (ref 6.4–8.2)
RBC # BLD AUTO: 4.45 M/UL (ref 4.1–5.7)
SODIUM SERPL-SCNC: 134 MMOL/L (ref 136–145)
WBC # BLD AUTO: 3.9 K/UL (ref 4.1–11.1)

## 2022-02-11 PROCEDURE — 99284 EMERGENCY DEPT VISIT MOD MDM: CPT

## 2022-02-11 PROCEDURE — 96361 HYDRATE IV INFUSION ADD-ON: CPT

## 2022-02-11 PROCEDURE — 70450 CT HEAD/BRAIN W/O DYE: CPT

## 2022-02-11 PROCEDURE — 82077 ASSAY SPEC XCP UR&BREATH IA: CPT

## 2022-02-11 PROCEDURE — 80053 COMPREHEN METABOLIC PANEL: CPT

## 2022-02-11 PROCEDURE — U0005 INFEC AGEN DETEC AMPLI PROBE: HCPCS

## 2022-02-11 PROCEDURE — 36415 COLL VENOUS BLD VENIPUNCTURE: CPT

## 2022-02-11 PROCEDURE — 82550 ASSAY OF CK (CPK): CPT

## 2022-02-11 PROCEDURE — 74011250636 HC RX REV CODE- 250/636: Performed by: STUDENT IN AN ORGANIZED HEALTH CARE EDUCATION/TRAINING PROGRAM

## 2022-02-11 PROCEDURE — 85025 COMPLETE CBC W/AUTO DIFF WBC: CPT

## 2022-02-11 PROCEDURE — 80177 DRUG SCRN QUAN LEVETIRACETAM: CPT

## 2022-02-11 PROCEDURE — 96374 THER/PROPH/DIAG INJ IV PUSH: CPT

## 2022-02-11 PROCEDURE — 74011000258 HC RX REV CODE- 258: Performed by: STUDENT IN AN ORGANIZED HEALTH CARE EDUCATION/TRAINING PROGRAM

## 2022-02-11 RX ADMIN — LEVETIRACETAM 1500 MG: 100 INJECTION, SOLUTION INTRAVENOUS at 18:24

## 2022-02-11 RX ADMIN — SODIUM CHLORIDE 1000 ML: 9 INJECTION, SOLUTION INTRAVENOUS at 18:25

## 2022-02-11 NOTE — Clinical Note
[facility] EMERGENCY DEPARTMENT  [address] [facility] [address]  [phone]    Work/School Note    Date: 2/11/2022    To Whom It May concern:      Daphney Xiong was seen and treated today in the emergency room by the following provider(s):  Attending Provider: Alyx Corcoran MD.      Daphney Xiong is excused from work/school on 02/11/22. He is clear to return to work/school on 02/12/22.         Sincerely,          Curly Jonny, DO

## 2022-02-11 NOTE — Clinical Note
[facility] EMERGENCY DEPARTMENT  [address] [facility] [place] [address]  [phone]    Work/School Note    Date: 2/11/2022    To Whom It May concern:      Tavares Winston was seen and treated today in the emergency room by the following provider(s):  Attending Provider: Lupe Garcia MD.      Tavares Winston is excused from work/school on 02/11/22. He is clear to return to work/school on 02/12/22.         Sincerely,          Delaney Carpenter RN

## 2022-02-11 NOTE — ED TRIAGE NOTES
BIB by EMS for numbness to all extremities  Pt is slow to answer questions  Has PM Hx of seizures but not sure that he had one today. Pt endorses nausea. Denies pain  Denies use of ETOH  Or drugs  Negative FAST.   Provider called to the bedside to assess the Pt's neuro status

## 2022-02-11 NOTE — ED PROVIDER NOTES
[facility]  EMERGENCY DEPARTMENT ENCOUNTER NOTE    Date: 2/11/2022  Patient Name: Ashkan Nogueira    History of Presenting Illness     Chief Complaint   Patient presents with    Numbness     HPI: Ashkan Nogueira, 39 y.o. male with a past medical history and outpatient medications as listed and reviewed below  presents for altered mental status and complaining of numbness in upper and lower extremities that is currently resolved. Patient denies any palpitations, lightheadedness, dizziness, syncope, shortness of breath, orthopnea, dyspnea on exertion, or lower extremity swelling. Currently the patient does not have any weakness, focal deficits, significant headache, vomiting, or other complaints. Per mother: patient was saying he had a headache, soreness, and numbness of upper and lower extremities. Patient has a history of epilepsy and unknown if had a seizure. He is more confused, sometimes doesn't remember if he had a seizure. Last seizure in January. Aptium missed for the past week. Mother thinks he might have had a seizure. Medical History   I reviewed the medical, surgical, family, and social history, as well as allergies:    PCP: Dain Cunningham MD    Past Medical History:  Past Medical History:   Diagnosis Date    Neurological disorder     Seizures ([address]      Past Surgical History:  No past surgical history on file. Current Outpatient Medications:  Current Outpatient Medications   Medication Instructions    diazePAM (Valtoco) 20 mg/2 spray (10mg/0.1mL x2) spry 1 Spray, Nasal, DAILY AS NEEDED    eslicarbazepine (Aptiom) 800 mg tab tablet TAKE 2 TABLETS BY MOUTH EVERY DAY IN THE MORNING    levETIRAcetam (KEPPRA XR) 500 mg ER tablet TAKE 6 TABLETS BY MOUTH NIGHTLY    melatonin 10 mg tab 1 Tablet, Oral, EVERY BEDTIME      Family History:  No family history on file.   Social History:  Social History     Tobacco Use    Smoking status: Current Some Day Smoker Packs/day: 0.00     Years: 10.00     Pack years: 0.00    Smokeless tobacco: Never Used   Vaping Use    Vaping Use: Never used   Substance Use Topics    Alcohol use: Yes     Comment: on occasion     Drug use: No     Allergies: Allergies   Allergen Reactions    Penicillins Rash       Review of Systems     Review of Systems  Negative: All other systems negative. Physical Exam and Vital Signs   Vital Signs - Reviewed the patient's vital signs. No data found. Physical Exam:    GENERAL: awake, alert, cooperative, not in distress  HEENT:  * Pupils equal, EOMI  * Head atraumatic  CV:  * regular rhythm  * warm and perfused extremities bilaterally  PULMONARY: Good air movement, no wheezes or crackles  ABDOMEN: soft, not distended, no guarding, not tenderness to palpation  : No suprapubic tenderness  EXTREMITIES/BACK: warm and perfused, no tenderness, no edema  SKIN: no rashes or signs of trauma  NEURO:   * GCS = 15 (E=4, V=5, M=6)  * Awake, alert, oriented x 3, normal speech  * CNs II-XII intact  * Strength 5/5 in all extremities  * Sensory exam grossly normal  * No pronator drift or dysmetria    Medical Decision Making   - I am the first and primary provider for this patient and am the primary provider of record. - I reviewed the vital signs, available nursing notes, past medical history, past surgical history, family history and social history. - Initial assessment performed. The patients presenting problems have been discussed, and the staff are in agreement with the care plan formulated and outlined with them. I have encouraged them to ask questions as they arise throughout their visit. - Available medical records, nursing notes, old EKGs, and EMS run sheets (if patient was EMS transported) were reviewed    MDM:   Patient is a 39 y.o. male presenting for seizures. Vitals reveal no significant abnormalities and physical exam reveals no significant abnormalities.  Based on the history, physical exam, risk factors, and vitals signs, I favor the following differential diagnoses: seizure due to medication noncompliance, electrolyte abnormalities, ICH, ESCOBAR, rhabdomyolysis. Will initiate workup and symptomatic treatment. See ED Course and Reassessment for results and interpretations. Results     Labs:  No results found for this or any previous visit (from the past 12 hour(s)). Radiologic Studies:  CT Results  (Last 48 hours)               02/11/22 1850  CT HEAD WO CONT Final result    Impression:  Normal exam           Narrative:  CT dose reduction was achieved through use of a standardized protocol tailored   for this examination and automatic exposure control for dose modulation. CT Head       History:        The sulcal pattern is symmetric. No abnormality is seen in gray or white matter. The ventricles are symmetric and normal in size. There is no midline shift or   mass effect, or evidence of hemorrhage. Bone windows show no fracture. CXR Results  (Last 48 hours)    None        Medications ordered:  Medications   levETIRAcetam (KEPPRA) 1,500 mg in 0.9% sodium chloride 100 mL IVPB (0 mg IntraVENous IV Completed 2/11/22 1839)   sodium chloride 0.9 % bolus infusion 1,000 mL (0 mL IntraVENous IV Completed 2/11/22 1925)     ED Course and Reassessment     ED Course:     ED Course as of 02/12/22 1327   Fri Feb 11, 2022   1820 CBC does not show any evidence of acute process. Leukocytosis not present to suggest infection. Hemoglobin not suggestive of acute anemia. Platelet count is normal.   [SS]   1842 No significant electrolyte derangements. Creatinine is not elevated more than baseline range making ESCOBAR unlikely. No significant transaminitis noted. Normal bilirubin. ETOH -ve, patient not alcohol intoxicated. [SS]   1904 The non-contrasted head CT was negative for acute process making acute intracranial bleed unlikely. No evidence of large subacute stroke, ventriculomegaly, or masses.    [SS]   2033 I assumed care of this patient at shift change. He is resting comfortably in bed. His brother is also at the bedside and states he is back to his normal baseline. No focal neurologic deficits, alert and oriented x4, cranial nerves II through XII grossly intact, no sensory deficits, no weakness in extremities, no pronator drift, no abnormal coordination, no abnormal gait, no abnormal deep tendon reflexes. No motor nor sensory deficits. No nystagmus. Patient ambulatory with stable gait. Discussed with patient the importance of taking his medications as prescribed. Patient discharged home in stable and ambulatory condition. [KYLIE]      ED Course User Index  [KYLIE] Cynthia Nunes DO  [SS] Sandrine Abraham MD       Final Disposition     Discharge: [facility]    Patient will be discharged from the Emergency Department in stable condition. All of the diagnostic tests were reviewed and any questions were answered. Diagnosis, results, follow up if applicable, and return precautions were discussed. I have also put together printed discharge instructions for them that include: 1) educational information regarding their diagnosis, 2) how to care for their diagnosis at home, as well a 3) list of reasons why they would want to return to the ED prior to their follow-up appointment, should their condition change. Any labs or imaging done in the ED will be either printed with the discharge paperwork or available through [address] DISCHARGE PLAN:  1. Cannot display discharge medications since this patient is not currently admitted. 2.   Follow-up Information     Follow up With Specialties Details Why Contact Info    Your primary care doctor  Schedule an appointment as soon as possible for a visit in 1 day      Please call your neurologist today to make an appointment            3. Return to ED if worse    4.    Discharge Medication List as of 2/11/2022  8:38 PM        Diagnosis     Clinical Impression:   1. Seizure disorder [facility])      Attestations:    Nathen Bradley MD    Please note that this dictation was completed with [employer], the computer voice recognition software. Quite often unanticipated grammatical, syntax, homophones, and other interpretive errors are inadvertently transcribed by the computer software. Please disregard these errors. Please excuse any errors that have escaped final proofreading. Thank you.

## 2022-02-12 LAB
SARS-COV-2, XPLCVT: NOT DETECTED
SOURCE, COVRS: NORMAL

## 2022-02-12 NOTE — DISCHARGE INSTRUCTIONS
Thank you! Thank you for allowing me to care for you in the emergency department. I sincerely hope that you are satisfied with your visit today. It is my goal to provide you with excellent care. Below you will find a list of your labs and imaging from your visit today. Should you have any questions regarding these results please do not hesitate to call the emergency department. Labs -     Recent Results (from the past 12 hour(s))   CBC WITH AUTOMATED DIFF    Collection Time: 02/11/22  6:00 PM   Result Value Ref Range    WBC 3.9 (L) 4.1 - 11.1 K/uL    RBC 4.45 4.10 - 5.70 M/uL    HGB 13.8 12.1 - 17.0 g/dL    HCT 40.3 36.6 - 50.3 %    MCV 90.6 80.0 - 99.0 FL    MCH 31.0 26.0 - 34.0 PG    MCHC 34.2 30.0 - 36.5 g/dL    RDW 11.9 11.5 - 14.5 %    PLATELET 823 705 - 283 K/uL    MPV 10.4 8.9 - 12.9 FL    NEUTROPHILS 43 32 - 75 %    LYMPHOCYTES 36 12 - 49 %    MONOCYTES 11 5 - 13 %    EOSINOPHILS 8 (H) 0 - 7 %    BASOPHILS 2 (H) 0 - 1 %    IMMATURE GRANULOCYTES 0 0.0 - 0.5 %    ABS. NEUTROPHILS 1.7 (L) 1.8 - 8.0 K/UL    ABS. LYMPHOCYTES 1.4 0.8 - 3.5 K/UL    ABS. MONOCYTES 0.4 0.0 - 1.0 K/UL    ABS. EOSINOPHILS 0.3 0.0 - 0.4 K/UL    ABS. BASOPHILS 0.1 0.0 - 0.1 K/UL    ABS. IMM. GRANS. 0.0 0.00 - 0.04 K/UL    DF AUTOMATED     METABOLIC PANEL, COMPREHENSIVE    Collection Time: 02/11/22  6:00 PM   Result Value Ref Range    Sodium 134 (L) 136 - 145 mmol/L    Potassium 3.5 3.5 - 5.1 mmol/L    Chloride 101 97 - 108 mmol/L    CO2 26 21 - 32 mmol/L    Anion gap 7 5 - 15 mmol/L    Glucose 121 (H) 65 - 100 mg/dL    BUN 7 6 - 20 mg/dL    Creatinine 0.92 0.70 - 1.30 mg/dL    BUN/Creatinine ratio 8 (L) 12 - 20      GFR est AA >60 >60 ml/min/1.73m2    GFR est non-AA >60 >60 ml/min/1.73m2    Calcium 8.2 (L) 8.5 - 10.1 mg/dL    Bilirubin, total 0.2 0.2 - 1.0 mg/dL    AST (SGOT) 14 (L) 15 - 37 U/L    ALT (SGPT) 13 12 - 78 U/L    Alk.  phosphatase 70 45 - 117 U/L    Protein, total 6.7 6.4 - 8.2 g/dL    Albumin 3.6 3.5 - 5.0 g/dL Globulin 3.1 2.0 - 4.0 g/dL    A-G Ratio 1.2 1.1 - 2.2     ETHYL ALCOHOL    Collection Time: 02/11/22  6:00 PM   Result Value Ref Range    ALCOHOL(ETHYL),SERUM <4 <10 mg/dL   CK    Collection Time: 02/11/22  6:00 PM   Result Value Ref Range     39 - 308 U/L       Radiologic Studies -   CT HEAD WO CONT   Final Result   Normal exam           CT Results  (Last 48 hours)                 02/11/22 1850  CT HEAD WO CONT Final result    Impression:  Normal exam           Narrative:  CT dose reduction was achieved through use of a standardized protocol tailored   for this examination and automatic exposure control for dose modulation. CT Head       History:        The sulcal pattern is symmetric. No abnormality is seen in gray or white matter. The ventricles are symmetric and normal in size. There is no midline shift or   mass effect, or evidence of hemorrhage. Bone windows show no fracture. CXR Results  (Last 48 hours)      None               If you feel that you have not received excellent quality care or timely care, please ask to speak to the nurse manager. Please choose us in the future for your continued health care needs. ------------------------------------------------------------------------------------------------------------  The exam and treatment you received in the Emergency Department were for an urgent problem and are not intended as complete care. It is important that you follow-up with a doctor, nurse practitioner, or physician assistant to:  (1) confirm your diagnosis,  (2) re-evaluation of changes in your illness and treatment, and  (3) for ongoing care. If your symptoms become worse or you do not improve as expected and you are unable to reach your usual health care provider, you should return to the Emergency Department. We are available 24 hours a day. Please take your discharge instructions with you when you go to your follow-up appointment.      If you have any problem arranging a follow-up appointment, contact the Emergency Department immediately. If a prescription has been provided, please have it filled as soon as possible to prevent a delay in treatment. Read the entire medication instruction sheet provided to you by the pharmacy. If you have any questions or reservations about taking the medication due to side effects or interactions with other medications, please call your primary care physician or contact the ER to speak with the charge nurse. Make an appointment with your family doctor or the physician you were referred to for follow-up of this visit as instructed on your discharge paperwork, as this is a mandatory follow-up. Return to the ER if you are unable to be seen or if you are unable to be seen in a timely manner. If you have any problem arranging the follow-up visit, contact the Emergency Department immediately.

## 2022-02-12 NOTE — ED NOTES
Pt discharge at this time. D/C instructions reviewed by this nurse and Pt verbalized understanding. Medications reviewed and pharmacy confirmed. Answers all questions to the best of my ability.   Pt discharged with his brother

## 2022-02-14 DIAGNOSIS — G40.109 PARTIAL EPILEPSY (HCC): ICD-10-CM

## 2022-02-14 RX ORDER — LEVETIRACETAM 500 MG/1
TABLET, EXTENDED RELEASE ORAL
Qty: 540 TABLET | Refills: 1 | Status: SHIPPED | OUTPATIENT
Start: 2022-02-14 | End: 2022-07-28

## 2022-02-14 RX ORDER — ESLICARBAZEPINE ACETATE 800 MG/1
TABLET ORAL
Qty: 180 TABLET | Refills: 1 | Status: SHIPPED | OUTPATIENT
Start: 2022-02-14 | End: 2022-07-27 | Stop reason: ALTCHOICE

## 2022-02-14 NOTE — TELEPHONE ENCOUNTER
Pt's mother called to let us know Pt is going to need a refill of both of these medications soon.      Requested Prescriptions     Pending Prescriptions Disp Refills    levETIRAcetam (KEPPRA XR) 500 mg ER tablet 540 Tablet 1     Sig: TAKE 6 TABLETS BY MOUTH NIGHTLY    eslicarbazepine (Aptiom) 800 mg tab tablet 180 Tablet 1     Sig: TAKE 2 TABLETS BY MOUTH EVERY DAY IN THE MORNING

## 2022-02-15 LAB — LEVETIRACETAM SERPL-MCNC: 15.6 UG/ML (ref 10–40)

## 2022-05-02 ENCOUNTER — APPOINTMENT (OUTPATIENT)
Dept: CT IMAGING | Age: 37
End: 2022-05-02
Attending: STUDENT IN AN ORGANIZED HEALTH CARE EDUCATION/TRAINING PROGRAM
Payer: MEDICARE

## 2022-05-02 ENCOUNTER — HOSPITAL ENCOUNTER (EMERGENCY)
Age: 37
Discharge: HOME | End: 2022-05-02
Attending: STUDENT IN AN ORGANIZED HEALTH CARE EDUCATION/TRAINING PROGRAM
Payer: MEDICARE

## 2022-05-02 VITALS
HEIGHT: 65 IN | WEIGHT: 150 LBS | OXYGEN SATURATION: 98 % | BODY MASS INDEX: 24.99 KG/M2 | TEMPERATURE: 98 F | SYSTOLIC BLOOD PRESSURE: 128 MMHG | HEART RATE: 91 BPM | RESPIRATION RATE: 18 BRPM | DIASTOLIC BLOOD PRESSURE: 77 MMHG

## 2022-05-02 DIAGNOSIS — G40.919 BREAKTHROUGH SEIZURE (HCC): Primary | ICD-10-CM

## 2022-05-02 LAB
ALBUMIN SERPL-MCNC: 4.4 G/DL (ref 3.5–5)
ALBUMIN/GLOB SERPL: 1.5 {RATIO} (ref 1.1–2.2)
ALP SERPL-CCNC: 78 U/L (ref 45–117)
ALT SERPL-CCNC: 29 U/L (ref 12–78)
ANION GAP SERPL CALC-SCNC: 11 MMOL/L (ref 5–15)
AST SERPL W P-5'-P-CCNC: 28 U/L (ref 15–37)
BASOPHILS # BLD: 0.1 K/UL (ref 0–0.1)
BASOPHILS NFR BLD: 1 % (ref 0–1)
BILIRUB SERPL-MCNC: 0.4 MG/DL (ref 0.2–1)
BUN SERPL-MCNC: 8 MG/DL (ref 6–20)
BUN/CREAT SERPL: 6 (ref 12–20)
CA-I BLD-MCNC: 9.7 MG/DL (ref 8.5–10.1)
CHLORIDE SERPL-SCNC: 102 MMOL/L (ref 97–108)
CO2 SERPL-SCNC: 23 MMOL/L (ref 21–32)
CREAT SERPL-MCNC: 1.42 MG/DL (ref 0.7–1.3)
DIFFERENTIAL METHOD BLD: NORMAL
EOSINOPHIL # BLD: 0.2 K/UL (ref 0–0.4)
EOSINOPHIL NFR BLD: 5 % (ref 0–7)
ERYTHROCYTE [DISTWIDTH] IN BLOOD BY AUTOMATED COUNT: 12.6 % (ref 11.5–14.5)
GLOBULIN SER CALC-MCNC: 3 G/DL (ref 2–4)
GLUCOSE SERPL-MCNC: 123 MG/DL (ref 65–100)
HCT VFR BLD AUTO: 46.9 % (ref 36.6–50.3)
HGB BLD-MCNC: 15.9 G/DL (ref 12.1–17)
IMM GRANULOCYTES # BLD AUTO: 0 K/UL (ref 0–0.04)
IMM GRANULOCYTES NFR BLD AUTO: 0 % (ref 0–0.5)
LYMPHOCYTES # BLD: 1.6 K/UL (ref 0.8–3.5)
LYMPHOCYTES NFR BLD: 34 % (ref 12–49)
MCH RBC QN AUTO: 30.6 PG (ref 26–34)
MCHC RBC AUTO-ENTMCNC: 33.9 G/DL (ref 30–36.5)
MCV RBC AUTO: 90.2 FL (ref 80–99)
MONOCYTES # BLD: 0.6 K/UL (ref 0–1)
MONOCYTES NFR BLD: 13 % (ref 5–13)
NEUTS SEG # BLD: 2.2 K/UL (ref 1.8–8)
NEUTS SEG NFR BLD: 47 % (ref 32–75)
NRBC # BLD: 0 K/UL (ref 0–0.01)
NRBC BLD-RTO: 0 PER 100 WBC
PLATELET # BLD AUTO: 224 K/UL (ref 150–400)
PMV BLD AUTO: 10 FL (ref 8.9–12.9)
POTASSIUM SERPL-SCNC: 4.3 MMOL/L (ref 3.5–5.1)
PROT SERPL-MCNC: 7.4 G/DL (ref 6.4–8.2)
RBC # BLD AUTO: 5.2 M/UL (ref 4.1–5.7)
SODIUM SERPL-SCNC: 136 MMOL/L (ref 136–145)
WBC # BLD AUTO: 4.6 K/UL (ref 4.1–11.1)

## 2022-05-02 PROCEDURE — 99284 EMERGENCY DEPT VISIT MOD MDM: CPT

## 2022-05-02 PROCEDURE — 80053 COMPREHEN METABOLIC PANEL: CPT

## 2022-05-02 PROCEDURE — 70450 CT HEAD/BRAIN W/O DYE: CPT

## 2022-05-02 PROCEDURE — 36415 COLL VENOUS BLD VENIPUNCTURE: CPT

## 2022-05-02 PROCEDURE — 85025 COMPLETE CBC W/AUTO DIFF WBC: CPT

## 2022-05-02 NOTE — ED NOTES
I have reviewed discharge instructions with the patient. The patient verbalized understanding. Steady gait upon discharge.

## 2022-05-02 NOTE — ED PROVIDER NOTES
EMERGENCY DEPARTMENT HISTORY AND PHYSICAL EXAM      Date: 5/2/2022  Patient Name: Lobito Cooper    History of Presenting Illness     Chief Complaint   Patient presents with    Seizure       History Provided By: Patient    HPI: Lobito Cooper, 39 y.o. male with a past medical history significant seizure presents to the ED with cc of seizure. Patient reportedly had a seizure this a.m., mother called EMS brought patient to emergency department. 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 Patient states he has not had a seizure for several years. Denies any current weakness dizziness headache blurry vision double vision. Denies any drug or alcohol use. Currently awake, alert, oriented. There are no other complaints, changes, or physical findings at this time. PCP: Other, MD Dain    No current facility-administered medications on file prior to encounter. Current Outpatient Medications on File Prior to Encounter   Medication Sig Dispense Refill    levETIRAcetam (KEPPRA XR) 500 mg ER tablet TAKE 6 TABLETS BY MOUTH NIGHTLY 668 Tablet 1    eslicarbazepine (Aptiom) 800 mg tab tablet TAKE 2 TABLETS BY MOUTH EVERY DAY IN THE MORNING 180 Tablet 1    diazePAM (Valtoco) 20 mg/2 spray (10mg/0.1mL x2) spry 1 Spray by Nasal route daily as needed (intractable seizure). 2 Each 5    melatonin 10 mg tab Take 1 Tab by mouth nightly. 30 Tab 5       Past History     Past Medical History:  Past Medical History:   Diagnosis Date    Neurological disorder     Seizures ([address]        Past Surgical History:  No past surgical history on file. Family History:  History reviewed. No pertinent family history.     Social History:  Social History     Tobacco Use    Smoking status: Current Some Day Smoker     Packs/day: 0.00     Years: 10.00     Pack years: 0.00    Smokeless tobacco: Never Used   Vaping Use    Vaping Use: Never used   Substance Use Topics    Alcohol use: Yes     Comment: on occasion     Drug use: No       Allergies: Allergies   Allergen Reactions    Penicillins Rash         Review of Systems     Review of Systems   Constitutional: Negative for activity change, appetite change, chills and fever. HENT: Negative for congestion, sore throat and trouble swallowing. Eyes: Negative for photophobia and visual disturbance. Respiratory: Negative for cough, chest tightness and shortness of breath. Cardiovascular: Negative for chest pain and palpitations. Gastrointestinal: Negative for abdominal pain and nausea. Genitourinary: Negative for dysuria and flank pain. Musculoskeletal: Negative for arthralgias and neck pain. Skin: Negative for color change and pallor. Neurological: Positive for seizures. Negative for dizziness, weakness, numbness and headaches. Physical Exam     Physical Exam  Vitals and nursing note reviewed. Constitutional:       Appearance: Normal appearance. He is normal weight. HENT:      Head: Normocephalic. Nose: Nose normal.      Mouth/Throat:      Mouth: Mucous membranes are moist.   Eyes:      Extraocular Movements: Extraocular movements intact. Pupils: Pupils are equal, round, and reactive to light. Cardiovascular:      Rate and Rhythm: Normal rate and regular rhythm. Pulses: Normal pulses. Heart sounds: Normal heart sounds. Pulmonary:      Effort: Pulmonary effort is normal.      Breath sounds: Normal breath sounds. Abdominal:      General: Abdomen is flat. Bowel sounds are normal.      Palpations: Abdomen is soft. Musculoskeletal:         General: No swelling or tenderness. Normal range of motion. Cervical back: Normal range of motion and neck supple. Skin:     General: Skin is warm and dry. Capillary Refill: Capillary refill takes less than 2 seconds. Neurological:      General: No focal deficit present.       Mental Status: He is alert and oriented to person, place, and time. Mental status is at baseline. Cranial Nerves: No cranial nerve deficit. Sensory: No sensory deficit. Motor: No weakness. Psychiatric:         Mood and Affect: Mood normal.         Behavior: Behavior normal.         Diagnostic Study Results     Labs -     Recent Results (from the past 12 hour(s))   CBC WITH AUTOMATED DIFF    Collection Time: 05/02/22 10:46 AM   Result Value Ref Range    WBC 4.6 4.1 - 11.1 K/uL    RBC 5.20 4. 10 - 5.70 M/uL    HGB 15.9 12.1 - 17.0 g/dL    HCT 46.9 36.6 - 50.3 %    MCV 90.2 80.0 - 99.0 FL    MCH 30.6 26.0 - 34.0 PG    MCHC 33.9 30.0 - 36.5 g/dL    RDW 12.6 11.5 - 14.5 %    PLATELET 229 210 - 497 K/uL    MPV 10.0 8.9 - 12.9 FL    NRBC 0.0 0.0  WBC    ABSOLUTE NRBC 0.00 0.00 - 0.01 K/uL    NEUTROPHILS 47 32 - 75 %    LYMPHOCYTES 34 12 - 49 %    MONOCYTES 13 5 - 13 %    EOSINOPHILS 5 0 - 7 %    BASOPHILS 1 0 - 1 %    IMMATURE GRANULOCYTES 0 0 - 0.5 %    ABS. NEUTROPHILS 2.2 1.8 - 8.0 K/UL    ABS. LYMPHOCYTES 1.6 0.8 - 3.5 K/UL    ABS. MONOCYTES 0.6 0.0 - 1.0 K/UL    ABS. EOSINOPHILS 0.2 0.0 - 0.4 K/UL    ABS. BASOPHILS 0.1 0.0 - 0.1 K/UL    ABS. IMM. GRANS. 0.0 0.00 - 0.04 K/UL    DF AUTOMATED     METABOLIC PANEL, COMPREHENSIVE    Collection Time: 05/02/22 10:46 AM   Result Value Ref Range    Sodium 136 136 - 145 mmol/L    Potassium 4.3 3.5 - 5.1 mmol/L    Chloride 102 97 - 108 mmol/L    CO2 23 21 - 32 mmol/L    Anion gap 11 5 - 15 mmol/L    Glucose 123 (H) 65 - 100 mg/dL    BUN 8 6 - 20 mg/dL    Creatinine 1.42 (H) 0.70 - 1.30 mg/dL    BUN/Creatinine ratio 6 (L) 12 - 20      GFR est AA >60 >60 ml/min/1.73m2    GFR est non-AA 56 (L) >60 ml/min/1.73m2    Calcium 9.7 8.5 - 10.1 mg/dL    Bilirubin, total 0.4 0.2 - 1.0 mg/dL    AST (SGOT) 28 15 - 37 U/L    ALT (SGPT) 29 12 - 78 U/L    Alk.  phosphatase 78 45 - 117 U/L    Protein, total 7.4 6.4 - 8.2 g/dL    Albumin 4.4 3.5 - 5.0 g/dL    Globulin 3.0 2.0 - 4.0 g/dL    A-G Ratio 1.5 1.1 - 2.2         Radiologic Studies -   @[email or web address]@  CT Results  (Last 48 hours)               05/02/22 1053  CT HEAD WO CONT Final result    Impression:  No acute intracranial process. Other findings as above. Narrative:  CT head, 5/2/2022       History: Seizure. Technique: No intravenous contrast was administered. Multiple contiguous axial   images were acquired from the vertex to the skull base. Coronal and sagittal   reconstruction was made. All CT scans at this facility are performed using dose reduction optimization   techniques as appropriate to perform the exam including the following: Automated   exposure control, adjustments of the mA and/or kV according to patient size, or   use iterative reconstruction technique. Comparison: Including CT head 2/11/2022. Findings:  Sulcal markings are prominent. The ventricular system is normal in   size and configuration. Gray-white differentiation is preserved. No acute   intracranial hemorrhage or midline shift is identified. The calvarium is intact. Soft tissue swelling is seen in the right parietal scalp. The orbits and globes are intact. There is mild to moderate opacification of   the ethmoid and left sphenoid sinuses. Acute sinusitis in the left sphenoid   sinus is possible. The remainder of the visualized paranasal sinuses and   mastoid air cells are clear. CXR Results  (Last 48 hours)    None            Medical Decision Making   I am the first provider for this patient. I reviewed the vital signs, available nursing notes, past medical history, past surgical history, family history and social history. Vital Signs-Reviewed the patient's vital signs.   Patient Vitals for the past 12 hrs:   Temp Pulse Resp BP SpO2   05/02/22 1040 98 °F (36.7 °C) 91 18 128/77 98 %       Records Reviewed: Nursing Notes    The patient presents with seizure with a differential diagnosis of breakthrough seizure, non compliance, ICH, electrolyte abnormalities      Provider Notes (Medical Decision Making):     MDM   36yom, hx of seizure disorder, on keppra, presents to the ED for 1 seizure. Patient compliant with medications. Patient awake alert and oriented, no distress, nonfocal neurological exam abrasions noted over parietal scalp. CT scan resulting, no signs of acute intracranial injury, lab work grossly normal.  Normal electrolytes. Patient has remained seizure-free while in emergency department, states he has not of medication at home, this time do not feel the patient requires loading given history of compliance. Will discharge patient home instructed to follow-up with neurology over the next week, return to emergency department if any worsening weakness dizziness seizure      ED Course:   Initial assessment performed. The patients presenting problems have been discussed, and they are in agreement with the care plan formulated and outlined with them. I have encouraged them to ask questions as they arise throughout their visit. PROCEDURES  Procedures         PLAN:  1. Current Discharge Medication List        2. Follow-up Information     Follow up With Specialties Details Why Contact Info    Your primary neurologist            Return to ED if worse     Diagnosis     Clinical Impression:   1.  Breakthrough seizure ([address]

## 2022-07-27 ENCOUNTER — OFFICE VISIT (OUTPATIENT)
Dept: NEUROLOGY | Age: 37
End: 2022-07-27
Payer: MEDICARE

## 2022-07-27 VITALS — OXYGEN SATURATION: 97 % | HEART RATE: 55 BPM | SYSTOLIC BLOOD PRESSURE: 116 MMHG | DIASTOLIC BLOOD PRESSURE: 80 MMHG

## 2022-07-27 DIAGNOSIS — G40.109 PARTIAL EPILEPSY (HCC): Primary | ICD-10-CM

## 2022-07-27 PROCEDURE — G8420 CALC BMI NORM PARAMETERS: HCPCS | Performed by: NURSE PRACTITIONER

## 2022-07-27 PROCEDURE — 99214 OFFICE O/P EST MOD 30 MIN: CPT | Performed by: NURSE PRACTITIONER

## 2022-07-27 PROCEDURE — G8432 DEP SCR NOT DOC, RNG: HCPCS | Performed by: NURSE PRACTITIONER

## 2022-07-27 PROCEDURE — G8428 CUR MEDS NOT DOCUMENT: HCPCS | Performed by: NURSE PRACTITIONER

## 2022-07-27 RX ORDER — CENOBAMATE 200 MG/1
1 TABLET, FILM COATED ORAL
Qty: 90 EACH | Refills: 1 | Status: SHIPPED | OUTPATIENT
Start: 2022-07-27 | End: 2022-10-26 | Stop reason: ALTCHOICE

## 2022-07-27 RX ORDER — CENOBAMATE 100 MG/1
1 TABLET, FILM COATED ORAL
Qty: 14 EACH | Refills: 0 | Status: SHIPPED | OUTPATIENT
Start: 2022-07-27 | End: 2022-10-26 | Stop reason: ALTCHOICE

## 2022-07-27 NOTE — PATIENT INSTRUCTIONS
Keep Keppra XR 6 tablets daily. Start Xcopri 50 mg daily for 2 weeks     Then increase to 100 mg daily X 2 weeks   And Drop to 1 tablet of Aptiom     Then increase to 200 mg daily. This is your dose.    Stop Aptiom

## 2022-07-27 NOTE — PROGRESS NOTES
Renny Griggs is a 39 y.o. 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 Has had a few breakthrough that had been witnessed since last visit. He does not remember   Mother has noticed a few also. Keppra XR 3000 and Aptiom 1600 mg  Having trouble with Aptiom and pharmacy and missing dosing. No memory or cognitive concerns. No recent blood taken  No falls. No other changes or concerns. Is living with mom   Working some odd jobs . Allergies   Allergen Reactions    Penicillins Rash       Current Outpatient Medications   Medication Sig    cenobamate (Xcopri) 200 mg tab Take 1 Tablet by mouth nightly. Max Daily Amount: 1 Tablet.  cenobamate (Xcopri) 50 mg tab tablet 1 tablet by mouth nightly X 2 weeks    cenobamate (Xcopri) 100 mg tab Take 1 Tablet by mouth nightly. Max Daily Amount: 1 Tablet. X 2 weeks    levETIRAcetam (KEPPRA XR) 500 mg ER tablet TAKE 6 TABLETS BY MOUTH NIGHTLY    diazePAM (Valtoco) 20 mg/2 spray (10mg/0.1mL x2) spry 1 Spray by Nasal route daily as needed (intractable seizure).  melatonin 10 mg tab Take 1 Tab by mouth nightly. No current facility-administered medications for this visit. Social History     Tobacco Use   Smoking Status Some Days    Packs/day: 0.00    Years: 10.00    Pack years: 0.00    Types: Cigarettes   Smokeless Tobacco Never       Past Medical History:   Diagnosis Date    Neurological disorder     Seizures ([facility] [address]        No past surgical history on file. No family history on file.     Social History     Socioeconomic History    Marital status: SINGLE   Tobacco Use    Smoking status: Some Days     Packs/day: 0.00     Years: 10.00     Pack years: 0.00     Types: Cigarettes    Smokeless tobacco: Never   Vaping Use    Vaping Use: Never used   Substance and Sexual Activity    Alcohol use: Yes     Comment: on occasion     Drug use: No    Sexual activity: Yes       Review of Systems   Eyes:  Negative for blurred vision, double vision and photophobia. Respiratory:  Negative for shortness of breath and wheezing. Cardiovascular:  Negative for chest pain and palpitations. Gastrointestinal:  Negative for nausea and vomiting. Neurological:  Positive for seizures and loss of consciousness. Negative for dizziness, tingling and headaches. Remainder of comprehensive review is negative. Physical Exam :    Visit Vitals  /80   Pulse (!) 55   SpO2 97%       General: Well defined, nourished, and groomed individual in no acute distress. Neck: Supple, nontender, no bruits, no pain with resistance to active range of motion. Musculoskeletal: Extremities revealed no edema and had full range of motion of joints. Psych: Good mood and bright affect    NEUROLOGICAL EXAMINATION:    Mental Status: Alert and oriented to person, place, and time    Cranial Nerves:    II, III, IV, VI: Visual acuity grossly intact. Visual fields are normal.    Pupils are equal, round, and reactive to light and accommodation. Extra-ocular movements are full and fluid. Fundoscopic exam was benign, no ptosis or nystagmus. V-XII: Hearing is grossly intact. Facial features are symmetric, with normal sensation and strength. The palate rises symmetrically and the tongue protrudes midline. Sternocleidomastoids 5/5. Motor Examination: Normal tone, bulk, and strength, 5/5 muscle strength throughout. Coordination: Finger to nose was normal. No resting or intention tremor    Gait and Station: Steady while walking. Normal arm swing. No pronator drift. No muscle wasting or fasiculations noted. Reflexes: DTRs 2+ throughout. Results for orders placed or performed during the hospital encounter of 05/02/22   CBC WITH AUTOMATED DIFF   Result Value Ref Range    WBC 4.6 4.1 - 11.1 K/uL    RBC 5.20 4. 10 - 5.70 M/uL    HGB 15.9 12.1 - 17.0 g/dL    HCT 46.9 36.6 - 50.3 %    MCV 90.2 80.0 - 99.0 FL    MCH 30.6 26.0 - 34.0 PG    MCHC 33.9 30.0 - 36.5 g/dL    RDW 12.6 11.5 - 14.5 %    PLATELET 522 843 - 653 K/uL    MPV 10.0 8.9 - 12.9 FL    NRBC 0.0 0.0  WBC    ABSOLUTE NRBC 0.00 0.00 - 0.01 K/uL    NEUTROPHILS 47 32 - 75 %    LYMPHOCYTES 34 12 - 49 %    MONOCYTES 13 5 - 13 %    EOSINOPHILS 5 0 - 7 %    BASOPHILS 1 0 - 1 %    IMMATURE GRANULOCYTES 0 0 - 0.5 %    ABS. NEUTROPHILS 2.2 1.8 - 8.0 K/UL    ABS. LYMPHOCYTES 1.6 0.8 - 3.5 K/UL    ABS. MONOCYTES 0.6 0.0 - 1.0 K/UL    ABS. EOSINOPHILS 0.2 0.0 - 0.4 K/UL    ABS. BASOPHILS 0.1 0.0 - 0.1 K/UL    ABS. IMM. GRANS. 0.0 0.00 - 0.04 K/UL    DF AUTOMATED     METABOLIC PANEL, COMPREHENSIVE   Result Value Ref Range    Sodium 136 136 - 145 mmol/L    Potassium 4.3 3.5 - 5.1 mmol/L    Chloride 102 97 - 108 mmol/L    CO2 23 21 - 32 mmol/L    Anion gap 11 5 - 15 mmol/L    Glucose 123 (H) 65 - 100 mg/dL    BUN 8 6 - 20 mg/dL    Creatinine 1.42 (H) 0.70 - 1.30 mg/dL    BUN/Creatinine ratio 6 (L) 12 - 20      GFR est AA >60 >60 ml/min/1.73m2    GFR est non-AA 56 (L) >60 ml/min/1.73m2    Calcium 9.7 8.5 - 10.1 mg/dL    Bilirubin, total 0.4 0.2 - 1.0 mg/dL    AST (SGOT) 28 15 - 37 U/L    ALT (SGPT) 29 12 - 78 U/L    Alk. phosphatase 78 45 - 117 U/L    Protein, total 7.4 6.4 - 8.2 g/dL    Albumin 4.4 3.5 - 5.0 g/dL    Globulin 3.0 2.0 - 4.0 g/dL    A-G Ratio 1.5 1.1 - 2.2         Orders Placed This Encounter    cenobamate (Xcopri) 200 mg tab     Sig: Take 1 Tablet by mouth nightly. Max Daily Amount: 1 Tablet. Dispense:  90 Each     Refill:  1    cenobamate (Xcopri) 50 mg tab tablet     Si tablet by mouth nightly X 2 weeks     Dispense:  14 Tablet     Refill:  0    cenobamate (Xcopri) 100 mg tab     Sig: Take 1 Tablet by mouth nightly. Max Daily Amount: 1 Tablet. X 2 weeks     Dispense:  14 Each     Refill:  0       1. Partial epilepsy (HCC)        Keep Keppra XR 6 tablets nightly.      Switch from Aptiom to Xcopri   50 mg X 2 weeks   100 mg X 2 weeks  Then increase to 200 mg QHS   Will write up a schedule. Keep track of symptoms. Failed Depakote, Trileptal, Vimpat.                This note will not be viewable in MyChart

## 2022-07-28 DIAGNOSIS — G40.109 PARTIAL EPILEPSY (HCC): ICD-10-CM

## 2022-07-28 RX ORDER — LEVETIRACETAM 500 MG/1
TABLET, EXTENDED RELEASE ORAL
Qty: 540 TABLET | Refills: 1 | Status: SHIPPED | OUTPATIENT
Start: 2022-07-28

## 2022-09-12 ENCOUNTER — TELEPHONE (OUTPATIENT)
Dept: NEUROLOGY | Age: 37
End: 2022-09-12

## 2022-09-12 NOTE — TELEPHONE ENCOUNTER
Patients mom called and stated she went to the pharmacy to get her sons prescription for the medication XCOPRI 50 mg and they told her they have not received anything from the doctor.     Please contact

## 2022-09-13 DIAGNOSIS — G40.109 PARTIAL EPILEPSY (HCC): ICD-10-CM

## 2022-09-13 RX ORDER — LEVETIRACETAM 500 MG/1
TABLET, EXTENDED RELEASE ORAL
Qty: 540 TABLET | Refills: 1 | OUTPATIENT
Start: 2022-09-13

## 2022-09-13 NOTE — TELEPHONE ENCOUNTER
Refill on:    Requested Prescriptions     Pending Prescriptions Disp Refills    levETIRAcetam (KEPPRA XR) 500 mg ER tablet 540 Tablet 1

## 2022-09-15 ENCOUNTER — TELEPHONE (OUTPATIENT)
Dept: NEUROLOGY | Age: 37
End: 2022-09-15

## 2022-09-28 NOTE — TELEPHONE ENCOUNTER
RE:Xcopri    This medication should not need a PA at this time as medication was recently approved information below       RE: Clifton Perks 50mg   [id number]  Effective date 8/8/22/12/31/22        RE:Xcopri 100mg   Auth# [id number]   Effective date 8/9/22-12/31/22        Re:Xcopri 200mg  PA-A [id number]  Effective date 8/9/22-12/31/22

## 2022-10-18 ENCOUNTER — TELEPHONE (OUTPATIENT)
Dept: NEUROLOGY | Age: 37
End: 2022-10-18

## 2022-10-20 DIAGNOSIS — G40.109 PARTIAL EPILEPSY (HCC): ICD-10-CM

## 2022-10-20 RX ORDER — ESLICARBAZEPINE ACETATE 800 MG/1
TABLET ORAL
Qty: 180 TABLET | Refills: 1 | Status: SHIPPED | OUTPATIENT
Start: 2022-10-20

## 2022-10-26 ENCOUNTER — OFFICE VISIT (OUTPATIENT)
Dept: NEUROLOGY | Age: 37
End: 2022-10-26
Payer: MEDICARE

## 2022-10-26 VITALS — HEART RATE: 57 BPM | OXYGEN SATURATION: 98 % | DIASTOLIC BLOOD PRESSURE: 84 MMHG | SYSTOLIC BLOOD PRESSURE: 134 MMHG

## 2022-10-26 DIAGNOSIS — G40.109 PARTIAL EPILEPSY (HCC): Primary | ICD-10-CM

## 2022-10-26 PROCEDURE — G8428 CUR MEDS NOT DOCUMENT: HCPCS | Performed by: NURSE PRACTITIONER

## 2022-10-26 PROCEDURE — 99215 OFFICE O/P EST HI 40 MIN: CPT | Performed by: NURSE PRACTITIONER

## 2022-10-26 PROCEDURE — G8432 DEP SCR NOT DOC, RNG: HCPCS | Performed by: NURSE PRACTITIONER

## 2022-10-26 PROCEDURE — G8420 CALC BMI NORM PARAMETERS: HCPCS | Performed by: NURSE PRACTITIONER
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
## 2023-01-10 ENCOUNTER — APPOINTMENT (OUTPATIENT)
Dept: CT IMAGING | Age: 38
End: 2023-01-10
Attending: STUDENT IN AN ORGANIZED HEALTH CARE EDUCATION/TRAINING PROGRAM
Payer: MEDICARE

## 2023-01-10 ENCOUNTER — HOSPITAL ENCOUNTER (EMERGENCY)
Age: 38
Discharge: HOME | End: 2023-01-10
Attending: STUDENT IN AN ORGANIZED HEALTH CARE EDUCATION/TRAINING PROGRAM | Admitting: STUDENT IN AN ORGANIZED HEALTH CARE EDUCATION/TRAINING PROGRAM
Payer: MEDICARE

## 2023-01-10 VITALS
HEART RATE: 57 BPM | TEMPERATURE: 98.5 F | SYSTOLIC BLOOD PRESSURE: 118 MMHG | BODY MASS INDEX: 24.16 KG/M2 | DIASTOLIC BLOOD PRESSURE: 67 MMHG | HEIGHT: 65 IN | RESPIRATION RATE: 17 BRPM | OXYGEN SATURATION: 96 % | WEIGHT: 145 LBS

## 2023-01-10 DIAGNOSIS — S01.81XA FACIAL LACERATION, INITIAL ENCOUNTER: ICD-10-CM

## 2023-01-10 DIAGNOSIS — G40.919 BREAKTHROUGH SEIZURE (HCC): Primary | ICD-10-CM

## 2023-01-10 LAB
ALBUMIN SERPL-MCNC: 4.2 G/DL (ref 3.5–5)
ALBUMIN/GLOB SERPL: 1.3 (ref 1.1–2.2)
ALP SERPL-CCNC: 63 U/L (ref 45–117)
ALT SERPL-CCNC: 29 U/L (ref 12–78)
ANION GAP SERPL CALC-SCNC: 11 MMOL/L (ref 5–15)
AST SERPL W P-5'-P-CCNC: 32 U/L (ref 15–37)
BASOPHILS # BLD: 0.1 K/UL (ref 0–0.1)
BASOPHILS NFR BLD: 1 % (ref 0–1)
BILIRUB SERPL-MCNC: 0.2 MG/DL (ref 0.2–1)
BUN SERPL-MCNC: 6 MG/DL (ref 6–20)
BUN/CREAT SERPL: 5 (ref 12–20)
CA-I BLD-MCNC: 9.1 MG/DL (ref 8.5–10.1)
CHLORIDE SERPL-SCNC: 102 MMOL/L (ref 97–108)
CO2 SERPL-SCNC: 22 MMOL/L (ref 21–32)
CREAT SERPL-MCNC: 1.12 MG/DL (ref 0.7–1.3)
DIFFERENTIAL METHOD BLD: NORMAL
EOSINOPHIL # BLD: 0.2 K/UL (ref 0–0.4)
EOSINOPHIL NFR BLD: 5 % (ref 0–7)
ERYTHROCYTE [DISTWIDTH] IN BLOOD BY AUTOMATED COUNT: 12 % (ref 11.5–14.5)
GLOBULIN SER CALC-MCNC: 3.2 G/DL (ref 2–4)
GLUCOSE SERPL-MCNC: 107 MG/DL (ref 65–100)
HCT VFR BLD AUTO: 40.9 % (ref 36.6–50.3)
HGB BLD-MCNC: 13.9 G/DL (ref 12.1–17)
IMM GRANULOCYTES # BLD AUTO: 0 K/UL (ref 0–0.04)
IMM GRANULOCYTES NFR BLD AUTO: 0 % (ref 0–0.5)
LACTATE SERPL-SCNC: 1 MMOL/L (ref 0.4–2)
LACTATE SERPL-SCNC: 7.3 MMOL/L (ref 0.4–2)
LYMPHOCYTES # BLD: 1.2 K/UL (ref 0.8–3.5)
LYMPHOCYTES NFR BLD: 29 % (ref 12–49)
MCH RBC QN AUTO: 30.5 PG (ref 26–34)
MCHC RBC AUTO-ENTMCNC: 34 G/DL (ref 30–36.5)
MCV RBC AUTO: 89.7 FL (ref 80–99)
MONOCYTES # BLD: 0.5 K/UL (ref 0–1)
MONOCYTES NFR BLD: 12 % (ref 5–13)
NEUTS SEG # BLD: 2.1 K/UL (ref 1.8–8)
NEUTS SEG NFR BLD: 53 % (ref 32–75)
NRBC # BLD: 0 K/UL (ref 0–0.01)
NRBC BLD-RTO: 0 PER 100 WBC
PLATELET # BLD AUTO: 214 K/UL (ref 150–400)
PMV BLD AUTO: 10 FL (ref 8.9–12.9)
POTASSIUM SERPL-SCNC: 4 MMOL/L (ref 3.5–5.1)
PROT SERPL-MCNC: 7.4 G/DL (ref 6.4–8.2)
RBC # BLD AUTO: 4.56 M/UL (ref 4.1–5.7)
SODIUM SERPL-SCNC: 135 MMOL/L (ref 136–145)
WBC # BLD AUTO: 4.1 K/UL (ref 4.1–11.1)

## 2023-01-10 PROCEDURE — 74011250636 HC RX REV CODE- 250/636: Performed by: STUDENT IN AN ORGANIZED HEALTH CARE EDUCATION/TRAINING PROGRAM

## 2023-01-10 PROCEDURE — 99284 EMERGENCY DEPT VISIT MOD MDM: CPT | Performed by: STUDENT IN AN ORGANIZED HEALTH CARE EDUCATION/TRAINING PROGRAM

## 2023-01-10 PROCEDURE — 74011000250 HC RX REV CODE- 250: Performed by: STUDENT IN AN ORGANIZED HEALTH CARE EDUCATION/TRAINING PROGRAM

## 2023-01-10 PROCEDURE — 36415 COLL VENOUS BLD VENIPUNCTURE: CPT

## 2023-01-10 PROCEDURE — 96361 HYDRATE IV INFUSION ADD-ON: CPT | Performed by: STUDENT IN AN ORGANIZED HEALTH CARE EDUCATION/TRAINING PROGRAM

## 2023-01-10 PROCEDURE — 80177 DRUG SCRN QUAN LEVETIRACETAM: CPT

## 2023-01-10 PROCEDURE — 83605 ASSAY OF LACTIC ACID: CPT

## 2023-01-10 PROCEDURE — 80053 COMPREHEN METABOLIC PANEL: CPT

## 2023-01-10 PROCEDURE — 90714 TD VACC NO PRESV 7 YRS+ IM: CPT | Performed by: STUDENT IN AN ORGANIZED HEALTH CARE EDUCATION/TRAINING PROGRAM

## 2023-01-10 PROCEDURE — 85025 COMPLETE CBC W/AUTO DIFF WBC: CPT

## 2023-01-10 PROCEDURE — 93005 ELECTROCARDIOGRAM TRACING: CPT

## 2023-01-10 PROCEDURE — 70450 CT HEAD/BRAIN W/O DYE: CPT

## 2023-01-10 PROCEDURE — 96365 THER/PROPH/DIAG IV INF INIT: CPT | Performed by: STUDENT IN AN ORGANIZED HEALTH CARE EDUCATION/TRAINING PROGRAM

## 2023-01-10 PROCEDURE — 90471 IMMUNIZATION ADMIN: CPT | Performed by: STUDENT IN AN ORGANIZED HEALTH CARE EDUCATION/TRAINING PROGRAM

## 2023-01-10 RX ORDER — LIDOCAINE HYDROCHLORIDE 10 MG/ML
10 INJECTION INFILTRATION; PERINEURAL ONCE
Status: COMPLETED | OUTPATIENT
Start: 2023-01-10 | End: 2023-01-10

## 2023-01-10 RX ORDER — LIDOCAINE HYDROCHLORIDE 10 MG/ML
5 INJECTION INFILTRATION; PERINEURAL ONCE
Status: DISCONTINUED | OUTPATIENT
Start: 2023-01-10 | End: 2023-01-10

## 2023-01-10 RX ADMIN — SODIUM CHLORIDE 1000 ML: 9 INJECTION, SOLUTION INTRAVENOUS at 19:38

## 2023-01-10 RX ADMIN — LIDOCAINE HYDROCHLORIDE 10 ML: 10 INJECTION, SOLUTION INFILTRATION; PERINEURAL at 21:23

## 2023-01-10 RX ADMIN — CLOSTRIDIUM TETANI TOXOID ANTIGEN (FORMALDEHYDE INACTIVATED) AND CORYNEBACTERIUM DIPHTHERIAE TOXOID ANTIGEN (FORMALDEHYDE INACTIVATED) 0.5 ML: 5; 2 INJECTION, SUSPENSION INTRAMUSCULAR at 22:46

## 2023-01-10 RX ADMIN — LEVETIRACETAM 3000 MG: 100 INJECTION, SOLUTION INTRAVENOUS at 20:13

## 2023-01-10 NOTE — ED PROVIDER NOTES
EMERGENCY DEPARTMENT HISTORY AND PHYSICAL EXAM      Date: 1/10/2023  Patient Name: Sindy Blevins    History of Presenting Illness     Chief Complaint   Patient presents with    Seizure       History Provided By: EMS    HPI: Sindy Blevins, 79 y.o. male   [address] [facility] adult male presents for evaluation following seizure-like activity. Patient was visiting a group home when he was witnessed to have generalized seizure activity. EMS was called and patient was found postictal.  He is able to ambulate out of the building, but then had a return of seizure-like activity. He was given Versed by EMS. Patient unable to provide history as he is arousable but confused    There are no other complaints, changes, or physical findings at this time. Past History     Past Medical History:  History reviewed. No pertinent past medical history. Past Surgical History:  No past surgical history on file. Family History:  History reviewed. No pertinent family history. Social History: Allergies:  Not on File    PCP: No primary care provider on file. No current facility-administered medications on file prior to encounter. No current outpatient medications on file prior to encounter. Review of Systems   Review of Systems  Patient postictal  Physical Exam   Physical Exam  Vitals reviewed. Constitutional:       General: He is not in acute distress. Appearance: He is not toxic-appearing. HENT:      Head: Normocephalic. Comments: Laceration to the right jawline  Eyes:      Extraocular Movements: Extraocular movements intact. Pupils: Pupils are equal, round, and reactive to light. Cardiovascular:      Rate and Rhythm: Normal rate and regular rhythm. Heart sounds: Normal heart sounds. Pulmonary:      Effort: Pulmonary effort is normal.      Breath sounds: Normal breath sounds. Abdominal:      Palpations: Abdomen is soft. Tenderness: There is no abdominal tenderness.    Musculoskeletal: Right lower leg: No edema. Left lower leg: No edema. Skin:     General: Skin is warm and dry. Neurological:      Comments: Confused, moving all extremities, awakens to voice but not answering questions appropriately   Psychiatric:         Mood and Affect: Mood normal.         Behavior: Behavior normal.        Lab and Diagnostic Study Results   Labs -   No results found for this or any previous visit (from the past 12 hour(s)). Radiologic Studies -   @lastxrresult@  CT Results  (Last 48 hours)      None          CXR Results  (Last 48 hours)      None            Medical Decision Making and ED Course   Differential Diagnosis & Medical Decision Making Provider Note:   Hematoma who presents for evaluation of seizure-like activity. Per outside records review of his correct identity, patient is prescribed Keppra. We will load him with Keppra here. Checking CBC, BMP, lactate. Also treating with IV fluids. He appears postictal now. Unclear why he had breakthrough seizures but will evaluate for electrolyte abnormalities versus infection. Noncompliance with his antiepileptics is also on the differential.    - I am the first provider for this patient. I reviewed the vital signs, available nursing notes, past medical history, past surgical history, family history and social history. The patients presenting problems have been discussed, and they are in agreement with the care plan formulated and outlined with them. I have encouraged them to ask questions as they arise throughout their visit. Vital Signs-Reviewed the patient's vital signs.   Patient Vitals for the past 12 hrs:   Temp Pulse Resp BP SpO2   01/10/23 1826 98.5 °F (36.9 °C) 82 14 127/78 100 %       ED Course:   ED Course as of 01/11/23 1641   Tue Joshua 10, 2023   1853 ECG performed at 1831 and interpreted by me shows normal sinus rhythm with a ventricular rate of 82, normal intervals, no ST elevation or depression [BQ]   1942 CT HEAD WO CONT  IMPRESSION  No acute abnormality. [BQ]   2128 Patient now awake and alert, at his mental baseline. Corroborates history of seizure disorder, denies any changes to his antiepileptic regimen recently. [BQ]      ED Course User Index  [BQ] Alec Vale MD         Procedures   Performed by: Kvng Wolfe MD  Wound Repair    Date/Time: 1/11/2023 4:41 PM  Performed by: attendingPreparation: skin prepped with ChloraPrep  Location details: face  Wound length:2.6 - 7.5 cm  Anesthesia: local infiltration    Anesthesia:  Local Anesthetic: lidocaine 1% with epinephrine  Anesthetic total: 2 mL  Foreign bodies: no foreign bodies  Irrigation solution: saline  Irrigation method: syringe  Debridement: none  Subcutaneous closure: gut  Number of sutures: 3  Technique: simple and interrupted  Approximation: close  Dressing: 4x4  My total time at bedside, performing this procedure was 1-15 minutes. Disposition   Disposition: DC- Adult Discharges: All of the diagnostic tests were reviewed and questions answered. Diagnosis, care plan and treatment options were discussed. The patient understands the instructions and will follow up as directed. The patients results have been reviewed with them. They have been counseled regarding their diagnosis. The patient verbally convey understanding and agreement of the signs, symptoms, diagnosis, treatment and prognosis and additionally agrees to follow up as recommended with their PCP in 24 - 48 hours. They also agree with the care-plan and convey that all of their questions have been answered. I have also put together some discharge instructions for them that include: 1) educational information regarding their diagnosis, 2) how to care for their diagnosis at home, as well a 3) list of reasons why they would want to return to the ED prior to their follow-up appointment, should their condition change. DISCHARGE PLAN:  1.  There are no discharge medications for this patient. 2.   Follow-up Information       Follow up With Specialties Details Why [facility]    [address] [facility] EMERGENCY DEPT Emergency Medicine  As needed, If symptoms worsen [address] [facility] [address] [phone]          3. Return to ED if worse   4. There are no discharge medications for this patient. Diagnosis/Clinical Impression     Clinical Impression:   1. Breakthrough seizure ([address]    2. Facial laceration, initial encounter        Attestations: Jose Luis HANCOCK MD, am the primary clinician of record. Please note that this dictation was completed with [employer], the [employer] voice recognition software. Quite often unanticipated grammatical, syntax, homophones, and other interpretive errors are inadvertently transcribed by the computer software. Please disregard these errors. Please excuse any errors that have escaped final proofreading. Thank you.

## 2023-01-11 NOTE — ED NOTES
Patient provided with discharge paperwork and instructions reviewed with patient. Patient verbalized understanding and denies having any further questions. Respirations even and unlabored, NAD. Patient ambulatory towards waiting room with all belongings.

## 2023-01-11 NOTE — ED NOTES
RN assumed care of patient at this time. Bedside/verbal report received from [facility] at this time. Patient continues to be in postictal state with confusion in semi-fowlers position. Patient on continuous BP, SpO2 and cardiac monitoring. Stretcher in lowest locked position and call bell within reach.

## 2023-01-11 NOTE — DISCHARGE INSTRUCTIONS
Thank you! Thank you for allowing me to care for you in the emergency department. It is my goal to provide you with excellent care. If you have not received excellent quality care, please ask to speak to the nurse manager. Please fill out the survey that will come to you by mail or email since we listen to your feedback! Below you will find a list of your tests from today's visit. Should you have any questions, please do not hesitate to call the emergency department. Labs  Recent Results (from the past 12 hour(s))   CBC WITH AUTOMATED DIFF    Collection Time: 01/10/23  6:57 PM   Result Value Ref Range    WBC 4.1 4.1 - 11.1 K/uL    RBC 4.56 4.10 - 5.70 M/uL    HGB 13.9 12.1 - 17.0 g/dL    HCT 40.9 36.6 - 50.3 %    MCV 89.7 80.0 - 99.0 FL    MCH 30.5 26.0 - 34.0 PG    MCHC 34.0 30.0 - 36.5 g/dL    RDW 12.0 11.5 - 14.5 %    PLATELET 256 471 - 141 K/uL    MPV 10.0 8.9 - 12.9 FL    NRBC 0.0 0.0  WBC    ABSOLUTE NRBC 0.00 0.00 - 0.01 K/uL    NEUTROPHILS 53 32 - 75 %    LYMPHOCYTES 29 12 - 49 %    MONOCYTES 12 5 - 13 %    EOSINOPHILS 5 0 - 7 %    BASOPHILS 1 0 - 1 %    IMMATURE GRANULOCYTES 0 0 - 0.5 %    ABS. NEUTROPHILS 2.1 1.8 - 8.0 K/UL    ABS. LYMPHOCYTES 1.2 0.8 - 3.5 K/UL    ABS. MONOCYTES 0.5 0.0 - 1.0 K/UL    ABS. EOSINOPHILS 0.2 0.0 - 0.4 K/UL    ABS. BASOPHILS 0.1 0.0 - 0.1 K/UL    ABS. IMM. GRANS. 0.0 0.00 - 0.04 K/UL    DF AUTOMATED     METABOLIC PANEL, COMPREHENSIVE    Collection Time: 01/10/23  6:57 PM   Result Value Ref Range    Sodium 135 (L) 136 - 145 mmol/L    Potassium 4.0 3.5 - 5.1 mmol/L    Chloride 102 97 - 108 mmol/L    CO2 22 21 - 32 mmol/L    Anion gap 11 5 - 15 mmol/L    Glucose 107 (H) 65 - 100 mg/dL    BUN 6 6 - 20 mg/dL    Creatinine 1.12 0.70 - 1.30 mg/dL    BUN/Creatinine ratio 5 (L) 12 - 20      eGFR 51 (L) >60 ml/min/1.73m2    Calcium 9.1 8.5 - 10.1 mg/dL    Bilirubin, total 0.2 0.2 - 1.0 mg/dL    AST (SGOT) 32 15 - 37 U/L    ALT (SGPT) 29 12 - 78 U/L    Alk.  phosphatase 63 45 - 117 U/L    Protein, total 7.4 6.4 - 8.2 g/dL    Albumin 4.2 3.5 - 5.0 g/dL    Globulin 3.2 2.0 - 4.0 g/dL    A-G Ratio 1.3 1.1 - 2.2     LACTIC ACID    Collection Time: 01/10/23  6:57 PM   Result Value Ref Range    Lactic acid 7.3 (HH) 0.4 - 2.0 mmol/L   LACTIC ACID    Collection Time: 01/10/23  9:10 PM   Result Value Ref Range    Lactic acid 1.0 0.4 - 2.0 mmol/L       Radiologic Studies  CT HEAD WO CONT   Final Result   No acute abnormality. CT Results  (Last 48 hours)                 01/10/23 1926  CT HEAD WO CONT Final result    Impression:  No acute abnormality. Narrative:  EXAM: CT HEAD WO CONT       INDICATION: seizure, eval for mass or bleed       COMPARISON: None. CONTRAST: None. TECHNIQUE: Unenhanced CT of the head was performed using 5 mm images. Brain and   bone windows were generated. Coronal and sagittal reformats. CT dose reduction   was achieved through use of a standardized protocol tailored for this   examination and automatic exposure control for dose modulation. FINDINGS:   The ventricles and sulci are normal in size, shape and configuration. There is   no significant white matter disease. There is no intracranial hemorrhage,   extra-axial collection, or mass effect. The basilar cisterns are open. No CT   evidence of acute infarct. The bone windows demonstrate no abnormalities. The visualized portions of the   paranasal sinuses and mastoid air cells are clear. CXR Results  (Last 48 hours)      None          ------------------------------------------------------------------------------------------------------------  The exam and treatment you received in the Emergency Department were for an urgent problem and are not intended as complete care.  It is important that you follow-up with a doctor, nurse practitioner, or physician assistant to:  (1) confirm your diagnosis,  (2) re-evaluation of changes in your illness and treatment, and  (3) for ongoing care. Please take your discharge instructions with you when you go to your follow-up appointment. If you have any problem arranging a follow-up appointment, contact the Emergency Department. If your symptoms become worse or you do not improve as expected and you are unable to reach your health care provider, please return to the Emergency Department. We are available 24 hours a day. If a prescription has been provided, please have it filled as soon as possible to prevent a delay in treatment. If you have any questions or reservations about taking the medication due to side effects or interactions with other medications, please call your primary care provider or contact the ER.

## 2023-01-12 LAB
ATRIAL RATE: 82 BPM
CALCULATED P AXIS, ECG09: 45 DEGREES
CALCULATED R AXIS, ECG10: 2 DEGREES
CALCULATED T AXIS, ECG11: 37 DEGREES
DIAGNOSIS, 93000: NORMAL
P-R INTERVAL, ECG05: 204 MS
Q-T INTERVAL, ECG07: 380 MS
QRS DURATION, ECG06: 104 MS
QTC CALCULATION (BEZET), ECG08: 443 MS
VENTRICULAR RATE, ECG03: 82 BPM

## 2023-01-13 LAB — LEVETIRACETAM SERPL-MCNC: 89.4 UG/ML (ref 10–40)

## 2023-03-01 ENCOUNTER — OFFICE VISIT (OUTPATIENT)
Dept: NEUROLOGY | Age: 38
End: 2023-03-01
Payer: MEDICARE

## 2023-03-01 VITALS — OXYGEN SATURATION: 99 % | SYSTOLIC BLOOD PRESSURE: 130 MMHG | HEART RATE: 67 BPM | DIASTOLIC BLOOD PRESSURE: 84 MMHG

## 2023-03-01 DIAGNOSIS — G40.919 BREAKTHROUGH SEIZURE (HCC): ICD-10-CM

## 2023-03-01 DIAGNOSIS — G40.109 PARTIAL EPILEPSY (HCC): Primary | ICD-10-CM

## 2023-03-01 PROCEDURE — G8432 DEP SCR NOT DOC, RNG: HCPCS | Performed by: NURSE PRACTITIONER

## 2023-03-01 PROCEDURE — 99214 OFFICE O/P EST MOD 30 MIN: CPT | Performed by: NURSE PRACTITIONER

## 2023-03-01 PROCEDURE — G8427 DOCREV CUR MEDS BY ELIG CLIN: HCPCS | Performed by: NURSE PRACTITIONER

## 2023-03-01 PROCEDURE — G8420 CALC BMI NORM PARAMETERS: HCPCS | Performed by: NURSE PRACTITIONER

## 2023-03-01 RX ORDER — LACOSAMIDE 50 MG/1
50 TABLET ORAL 2 TIMES DAILY
Qty: 14 TABLET | Refills: 0 | Status: SHIPPED | OUTPATIENT
Start: 2023-03-01 | End: 2023-03-08

## 2023-03-01 RX ORDER — LACOSAMIDE 100 MG/1
100 TABLET ORAL 2 TIMES DAILY
Qty: 14 TABLET | Refills: 0 | Status: SHIPPED | OUTPATIENT
Start: 2023-03-01 | End: 2023-03-08

## 2023-03-01 RX ORDER — LACOSAMIDE 150 MG/1
150 TABLET ORAL 2 TIMES DAILY
Qty: 60 TABLET | Refills: 4 | Status: SHIPPED | OUTPATIENT
Start: 2023-03-01

## 2023-03-01 NOTE — PATIENT INSTRUCTIONS
Start Vimpat 50 mg twice daily X 1 week     Then increase to 100 mg twice daily X 1 week       When starting on 150 mg twice daily dosing,   Drop off 1 tablet of Aptiom and do this for for 1 week       Keep Vimpat 150 mg twice daily.    Then drop Aptiom to 1/2 tablet X 1 week then STP Aptiom

## 2023-03-01 NOTE — PROGRESS NOTES
Jeremy Comer is a 44 y.o. male who presents with the following  Chief Complaint   Patient presents with    Follow-up       HPI    Follow-up with mother for seizures  He is currently on Aptiom 1600 and Keppra 3000 mg  We tried to switch to [place] but we have had some trouble getting this from the pharmacy so they do not want to switch because of this  He has had 3 breakthrough seizures since last visit  2 out and 1 at home  He went to [facility] and had a CT which was negative acutely  He did not miss meds  He was not sleep deprived  He has been on Depakote and Lamictal in the past  Did bit his tongue   No loss of bowel or bladder  He sleeps well  He eats well and  He gets out to do some side jobs  He does like to exercise  He does have the [address]  He did moved to [place] and is not very happy about this right now        Allergies   Allergen Reactions    Penicillins Rash       Current Outpatient Medications   Medication Sig    lacosamide (Vimpat) 50 mg tab tablet Take 1 Tablet by mouth two (2) times a day for 7 days. Max Daily Amount: 100 mg.    lacosamide (Vimpat) 100 mg tab tablet Take 1 Tablet by mouth two (2) times a day for 7 days. Max Daily Amount: 200 mg.    lacosamide (Vimpat) 150 mg tab tablet Take 1 Tablet by mouth two (2) times a day. Max Daily Amount: 949 mg.    eslicarbazepine (Aptiom) 800 mg tab tablet TAKE 2 TABLETS BY MOUTH EVERY DAY IN THE MORNING    levETIRAcetam (KEPPRA XR) 500 mg ER tablet TAKE 6 TABLETS BY MOUTH NIGHTLY    diazePAM (Valtoco) 20 mg/2 spray (10mg/0.1mL x2) spry 1 Spray by Nasal route daily as needed (intractable seizure). melatonin 10 mg tab Take 1 Tab by mouth nightly. (Patient not taking: Reported on 3/1/2023)     No current facility-administered medications for this visit.        Social History     Tobacco Use   Smoking Status Not on file   Smokeless Tobacco Never       Past Medical History:   Diagnosis Date    Neurological disorder     Seizures ([address]        No past surgical history on file. No family history on file. Social History     Socioeconomic History    Marital status: SINGLE   Tobacco Use    Smokeless tobacco: Never   Vaping Use    Vaping Use: Never used   Substance and Sexual Activity    Alcohol use: Yes     Comment: on occasion     Drug use: No    Sexual activity: Yes   Social History Narrative    ** Merged History Encounter **            Review of Systems   Eyes:  Negative for blurred vision, double vision and photophobia. Respiratory:  Negative for cough, hemoptysis, shortness of breath and wheezing. Gastrointestinal:  Negative for abdominal pain, nausea and vomiting. Neurological:  Positive for seizures and loss of consciousness. Psychiatric/Behavioral:  Negative for memory loss. The patient is not nervous/anxious and does not have insomnia. Remainder of comprehensive review is negative. Physical Exam :    Visit Vitals  /84 (BP 1 Location: Left upper arm, BP Patient Position: Sitting, BP Cuff Size: Adult)   Pulse 67   SpO2 99%       General: Well defined, nourished, and groomed individual in no acute distress. Musculoskeletal: Extremities revealed no edema and had full range of motion of joints. Psych: Good mood and bright affect    NEUROLOGICAL EXAMINATION:    Mental Status: Alert and oriented to person, place, and time    Cranial Nerves:    II, III, IV, VI: Visual acuity grossly intact. Visual fields are normal.    Pupils are equal, round, and reactive to light and accommodation. Extra-ocular movements are full and fluid. Fundoscopic exam was benign, no ptosis or nystagmus. V-XII: Hearing is grossly intact. Facial features are symmetric, with normal sensation and strength. The palate rises symmetrically and the tongue protrudes midline. Sternocleidomastoids 5/5. Motor Examination: Normal tone, bulk, and strength, 5/5 muscle strength throughout.      Coordination: Finger to nose was normal. No resting or intention tremor    Gait and Station: Steady while walking. Normal arm swing. No pronator drift. No muscle wasting or fasiculations noted. Reflexes: DTRs 2+ throughout. Results for orders placed or performed during the hospital encounter of 01/10/23   CBC WITH AUTOMATED DIFF   Result Value Ref Range    WBC 4.1 4.1 - 11.1 K/uL    RBC 4.56 4.10 - 5.70 M/uL    HGB 13.9 12.1 - 17.0 g/dL    HCT 40.9 36.6 - 50.3 %    MCV 89.7 80.0 - 99.0 FL    MCH 30.5 26.0 - 34.0 PG    MCHC 34.0 30.0 - 36.5 g/dL    RDW 12.0 11.5 - 14.5 %    PLATELET 523 145 - 191 K/uL    MPV 10.0 8.9 - 12.9 FL    NRBC 0.0 0.0  WBC    ABSOLUTE NRBC 0.00 0.00 - 0.01 K/uL    NEUTROPHILS 53 32 - 75 %    LYMPHOCYTES 29 12 - 49 %    MONOCYTES 12 5 - 13 %    EOSINOPHILS 5 0 - 7 %    BASOPHILS 1 0 - 1 %    IMMATURE GRANULOCYTES 0 0 - 0.5 %    ABS. NEUTROPHILS 2.1 1.8 - 8.0 K/UL    ABS. LYMPHOCYTES 1.2 0.8 - 3.5 K/UL    ABS. MONOCYTES 0.5 0.0 - 1.0 K/UL    ABS. EOSINOPHILS 0.2 0.0 - 0.4 K/UL    ABS. BASOPHILS 0.1 0.0 - 0.1 K/UL    ABS. IMM. GRANS. 0.0 0.00 - 0.04 K/UL    DF AUTOMATED     METABOLIC PANEL, COMPREHENSIVE   Result Value Ref Range    Sodium 135 (L) 136 - 145 mmol/L    Potassium 4.0 3.5 - 5.1 mmol/L    Chloride 102 97 - 108 mmol/L    CO2 22 21 - 32 mmol/L    Anion gap 11 5 - 15 mmol/L    Glucose 107 (H) 65 - 100 mg/dL    BUN 6 6 - 20 mg/dL    Creatinine 1.12 0.70 - 1.30 mg/dL    BUN/Creatinine ratio 5 (L) 12 - 20      eGFR 51 (L) >60 ml/min/1.73m2    Calcium 9.1 8.5 - 10.1 mg/dL    Bilirubin, total 0.2 0.2 - 1.0 mg/dL    AST (SGOT) 32 15 - 37 U/L    ALT (SGPT) 29 12 - 78 U/L    Alk.  phosphatase 63 45 - 117 U/L    Protein, total 7.4 6.4 - 8.2 g/dL    Albumin 4.2 3.5 - 5.0 g/dL    Globulin 3.2 2.0 - 4.0 g/dL    A-G Ratio 1.3 1.1 - 2.2     LACTIC ACID   Result Value Ref Range    Lactic acid 7.3 (HH) 0.4 - 2.0 mmol/L   LEVETIRACETAM (KEPPRA)   Result Value Ref Range    Levetiracetam (Keppra) 89.4 (H) 10.0 - 40.0 ug/mL   LACTIC ACID   Result Value Ref Range    Lactic acid 1.0 0.4 - 2.0 mmol/L   EKG, 12 LEAD, INITIAL   Result Value Ref Range    Ventricular Rate 82 BPM    Atrial Rate 82 BPM    P-R Interval 204 ms    QRS Duration 104 ms    Q-T Interval 380 ms    QTC Calculation (Bezet) 443 ms    Calculated P Axis 45 degrees    Calculated R Axis 2 degrees    Calculated T Axis 37 degrees    Diagnosis       Normal sinus rhythm  Septal infarct , age undetermined  Abnormal ECG  No previous ECGs available  Confirmed by Shara Mora ([id number]) on 1/12/2023 8:57:02 AM         Orders Placed This Encounter    MRI BRAIN W WO CONT     Standing Status:   Future     Standing Expiration Date:   4/1/2024     Order Specific Question:   STAT Creatinine as indicated     Answer:   Yes    lacosamide (Vimpat) 50 mg tab tablet     Sig: Take 1 Tablet by mouth two (2) times a day for 7 days. Max Daily Amount: 100 mg. Dispense:  14 Tablet     Refill:  0    lacosamide (Vimpat) 100 mg tab tablet     Sig: Take 1 Tablet by mouth two (2) times a day for 7 days. Max Daily Amount: 200 mg. Dispense:  14 Tablet     Refill:  0    lacosamide (Vimpat) 150 mg tab tablet     Sig: Take 1 Tablet by mouth two (2) times a day. Max Daily Amount: 300 mg. Dispense:  60 Tablet     Refill:  4       1. Partial epilepsy ([address]    2.  Breakthrough seizure ([address]      Breakthrough seizure on 3 separate occasions  We will switch from Aptiom to Vimpat up to 150 mg daily with a written out schedule  We will continue Keppra at 3000 mg daily  Get an MRI of the brain as he has never actually had 1 he is only had CT when you look for temporal lobe lesion versus other abnormality                This note will not be viewable in [employer]

## 2023-03-07 DIAGNOSIS — G40.109 PARTIAL EPILEPSY (HCC): ICD-10-CM

## 2023-03-07 RX ORDER — LACOSAMIDE 150 MG/1
150 TABLET ORAL 2 TIMES DAILY
Qty: 60 TABLET | Refills: 4 | OUTPATIENT
Start: 2023-03-07

## 2023-03-07 RX ORDER — LACOSAMIDE 100 MG/1
100 TABLET ORAL 2 TIMES DAILY
Qty: 14 TABLET | Refills: 0 | OUTPATIENT
Start: 2023-03-07 | End: 2023-03-14

## 2023-03-07 RX ORDER — LACOSAMIDE 50 MG/1
50 TABLET ORAL 2 TIMES DAILY
Qty: 14 TABLET | Refills: 0 | OUTPATIENT
Start: 2023-03-07 | End: 2023-03-14

## 2023-03-07 NOTE — TELEPHONE ENCOUNTER
Requested Prescriptions     Pending Prescriptions Disp Refills    lacosamide (Vimpat) 100 mg tab tablet 14 Tablet 0     Sig: Take 1 Tablet by mouth two (2) times a day for 7 days. Max Daily Amount: 200 mg.    lacosamide (Vimpat) 150 mg tab tablet 60 Tablet 4     Sig: Take 1 Tablet by mouth two (2) times a day. Max Daily Amount: 300 mg.    lacosamide (Vimpat) 50 mg tab tablet 14 Tablet 0     Sig: Take 1 Tablet by mouth two (2) times a day for 7 days. Max Daily Amount: 100 mg. Pt states previous refiil was sent to [facility].  Please send refills to [facility] [place] (updated in system)

## 2023-03-10 DIAGNOSIS — G40.109 PARTIAL EPILEPSY (HCC): ICD-10-CM

## 2023-03-10 RX ORDER — LEVETIRACETAM 500 MG/1
TABLET, EXTENDED RELEASE ORAL
Qty: 540 TABLET | Refills: 1 | Status: SHIPPED | OUTPATIENT
Start: 2023-03-10

## 2023-03-14 DIAGNOSIS — G40.109 PARTIAL EPILEPSY (HCC): ICD-10-CM

## 2023-03-14 NOTE — TELEPHONE ENCOUNTER
[facility] [address] [place] Please call mother to confirm when it has been sent.

## 2023-03-15 RX ORDER — LACOSAMIDE 150 MG/1
150 TABLET ORAL 2 TIMES DAILY
Qty: 60 TABLET | Refills: 4 | Status: SHIPPED | OUTPATIENT
Start: 2023-03-15

## 2023-03-22 DIAGNOSIS — G40.109 PARTIAL EPILEPSY (HCC): ICD-10-CM

## 2023-03-22 RX ORDER — LACOSAMIDE 50 MG/1
50 TABLET ORAL 2 TIMES DAILY
Qty: 14 TABLET | Refills: 0 | Status: SHIPPED | OUTPATIENT
Start: 2023-03-22 | End: 2023-03-29

## 2023-03-22 RX ORDER — LACOSAMIDE 100 MG/1
100 TABLET ORAL 2 TIMES DAILY
Qty: 14 TABLET | Refills: 0 | Status: SHIPPED | OUTPATIENT
Start: 2023-03-22 | End: 2023-03-29

## 2023-03-25 ENCOUNTER — HOSPITAL ENCOUNTER (OUTPATIENT)
Dept: MRI IMAGING | Age: 38
Discharge: HOME | End: 2023-03-25
Attending: NURSE PRACTITIONER
Payer: MEDICARE

## 2023-03-25 DIAGNOSIS — G40.919 BREAKTHROUGH SEIZURE (HCC): ICD-10-CM

## 2023-03-25 DIAGNOSIS — G40.109 PARTIAL EPILEPSY (HCC): ICD-10-CM

## 2023-03-25 PROCEDURE — A9576 INJ PROHANCE MULTIPACK: HCPCS | Performed by: NURSE PRACTITIONER

## 2023-03-25 PROCEDURE — 70553 MRI BRAIN STEM W/O & W/DYE: CPT

## 2023-03-25 PROCEDURE — 74011250636 HC RX REV CODE- 250/636: Performed by: NURSE PRACTITIONER

## 2023-03-25 RX ADMIN — GADOTERIDOL 13 ML: 279.3 INJECTION, SOLUTION INTRAVENOUS at 13:49

## 2023-04-21 DIAGNOSIS — G40.109 PARTIAL EPILEPSY (HCC): Primary | ICD-10-CM

## 2023-04-24 RX ORDER — CENOBAMATE 200 MG/1
TABLET, FILM COATED ORAL
Qty: 90 EACH | Refills: 1 | Status: SHIPPED | OUTPATIENT
Start: 2023-04-24 | End: 2023-04-26

## 2023-04-26 DIAGNOSIS — G40.109 PARTIAL EPILEPSY (HCC): ICD-10-CM

## 2023-04-26 RX ORDER — CENOBAMATE 200 MG/1
1 TABLET, FILM COATED ORAL DAILY
Qty: 90 EACH | Refills: 1 | Status: SHIPPED | OUTPATIENT
Start: 2023-04-26

## 2023-04-26 RX ORDER — CENOBAMATE 200 MG/1
TABLET, FILM COATED ORAL
Qty: 90 EACH | Refills: 1 | Status: SHIPPED | OUTPATIENT
Start: 2023-04-26 | End: 2023-04-26 | Stop reason: SDUPTHER

## 2023-05-24 ENCOUNTER — TELEPHONE (OUTPATIENT)
Age: 38
End: 2023-05-24

## 2023-05-24 NOTE — TELEPHONE ENCOUNTER
Patient mother stated her son do not want to take the medication that was recently prescribed to him. He only want to take medication Keppra and Aptiom    Please call to discuss.

## 2023-05-25 DIAGNOSIS — G40.109 LOCALIZATION-RELATED (FOCAL) (PARTIAL) SYMPTOMATIC EPILEPSY AND EPILEPTIC SYNDROMES WITH SIMPLE PARTIAL SEIZURES, NOT INTRACTABLE, WITHOUT STATUS EPILEPTICUS (HCC): Primary | ICD-10-CM

## 2023-05-25 RX ORDER — MIDAZOLAM 5 MG/.1ML
SPRAY NASAL
Qty: 2 EACH | Refills: 4 | Status: SHIPPED | OUTPATIENT
Start: 2023-05-25

## 2023-06-07 ENCOUNTER — OFFICE VISIT (OUTPATIENT)
Age: 38
End: 2023-06-07
Payer: MEDICAID

## 2023-06-07 VITALS — SYSTOLIC BLOOD PRESSURE: 110 MMHG | OXYGEN SATURATION: 98 % | DIASTOLIC BLOOD PRESSURE: 72 MMHG | HEART RATE: 59 BPM

## 2023-06-07 DIAGNOSIS — G40.109 LOCALIZATION-RELATED (FOCAL) (PARTIAL) SYMPTOMATIC EPILEPSY AND EPILEPTIC SYNDROMES WITH SIMPLE PARTIAL SEIZURES, NOT INTRACTABLE, WITHOUT STATUS EPILEPTICUS (HCC): ICD-10-CM

## 2023-06-07 PROCEDURE — 99214 OFFICE O/P EST MOD 30 MIN: CPT | Performed by: NURSE PRACTITIONER

## 2023-06-07 RX ORDER — LEVETIRACETAM 500 MG/1
TABLET, EXTENDED RELEASE ORAL
Qty: 180 TABLET | Refills: 5 | Status: SHIPPED | OUTPATIENT
Start: 2023-06-07

## 2023-06-08 NOTE — PROGRESS NOTES
No seizures  Handling med well  No concerns
Result:      Carotid Doppler:        Recent Labs:  Lab Results   Component Value Date    WBC 4.1 01/10/2023    HGB 13.9 01/10/2023    HCT 40.9 01/10/2023    MCV 89.7 01/10/2023     01/10/2023     Lab Results   Component Value Date     (L) 01/10/2023    K 4.0 01/10/2023     01/10/2023    CO2 22 01/10/2023    BUN 6 01/10/2023    CREATININE 1.12 01/10/2023    GLUCOSE 107 (H) 01/10/2023    CALCIUM 9.1 01/10/2023    PROT 7.4 01/10/2023    LABALBU 4.2 01/10/2023    BILITOT 0.2 01/10/2023    ALKPHOS 63 01/10/2023    AST 32 01/10/2023    ALT 29 01/10/2023    GFRAA >60 05/02/2022    AGRATIO 1.3 01/10/2023    GLOB 3.2 01/10/2023       Lab Results   Component Value Date    CHOL 189 10/12/2021     Lab Results   Component Value Date    TRIG 125 10/12/2021     Lab Results   Component Value Date    HDL 49 10/12/2021     Lab Results   Component Value Date    LDLCALC 118 (H) 10/12/2021     Lab Results   Component Value Date    VLDL 22 10/12/2021     No results found for: CHOLHDLRATIO    No results found for: SEDRATE    Hemoglobin A1C   Date Value Ref Range Status   10/12/2021 5.2 4.8 - 5.6 % Final     Comment:              Prediabetes: 5.7 - 6.4           Diabetes: >6.4           Glycemic control for adults with diabetes: <7.0       No results found for: RUSSELL             1. Localization-related (focal) (partial) symptomatic epilepsy and epileptic syndromes with simple partial seizures, not intractable, without status epilepticus (HCC)  -     levETIRAcetam (KEPPRA XR) 500 MG TB24 extended release tablet; TAKE 6 TABLETS BY MOUTH NIGHTLY, Disp-180 tablet, R-5Normal       Continue Aptiom 1600 mg daily  Continue Keppra 3000 mg daily  He is doing well here currently with no recent seizures  Stay active and engage  Can follow-up in 6 months          This note will not be viewable in [employer]

## 2023-07-28 ENCOUNTER — HOSPITAL ENCOUNTER (EMERGENCY)
Facility: HOSPITAL | Age: 38
Discharge: HOME | End: 2023-07-28
Attending: EMERGENCY MEDICINE
Payer: MEDICAID

## 2023-07-28 VITALS
HEART RATE: 60 BPM | TEMPERATURE: 97.6 F | SYSTOLIC BLOOD PRESSURE: 122 MMHG | WEIGHT: 127.9 LBS | HEIGHT: 65 IN | BODY MASS INDEX: 21.31 KG/M2 | OXYGEN SATURATION: 98 % | DIASTOLIC BLOOD PRESSURE: 72 MMHG | RESPIRATION RATE: 14 BRPM

## 2023-07-28 DIAGNOSIS — R56.9 SEIZURE (HCC): Primary | ICD-10-CM

## 2023-07-28 LAB
ALBUMIN SERPL-MCNC: 4.1 G/DL (ref 3.5–5)
ALBUMIN/GLOB SERPL: 1.2 (ref 1.1–2.2)
ALP SERPL-CCNC: 97 U/L (ref 45–117)
ALT SERPL-CCNC: 51 U/L (ref 12–78)
AMPHET UR QL SCN: NEGATIVE
ANION GAP SERPL CALC-SCNC: 6 MMOL/L (ref 5–15)
AST SERPL W P-5'-P-CCNC: 28 U/L (ref 15–37)
BARBITURATES UR QL SCN: NEGATIVE
BASOPHILS # BLD: 0.1 K/UL (ref 0–0.1)
BASOPHILS NFR BLD: 1 % (ref 0–1)
BENZODIAZ UR QL: NEGATIVE
BILIRUB SERPL-MCNC: 0.3 MG/DL (ref 0.2–1)
BUN SERPL-MCNC: 10 MG/DL (ref 6–20)
BUN/CREAT SERPL: 10 (ref 12–20)
CA-I BLD-MCNC: 9.1 MG/DL (ref 8.5–10.1)
CANNABINOIDS UR QL SCN: NEGATIVE
CHLORIDE SERPL-SCNC: 103 MMOL/L (ref 97–108)
CO2 SERPL-SCNC: 29 MMOL/L (ref 21–32)
COCAINE UR QL SCN: NEGATIVE
CREAT SERPL-MCNC: 1 MG/DL (ref 0.7–1.3)
DIFFERENTIAL METHOD BLD: ABNORMAL
EOSINOPHIL # BLD: 0.4 K/UL (ref 0–0.4)
EOSINOPHIL NFR BLD: 8 % (ref 0–7)
ERYTHROCYTE [DISTWIDTH] IN BLOOD BY AUTOMATED COUNT: 12 % (ref 11.5–14.5)
ETHANOL SERPL-MCNC: <10 MG/DL (ref 0–0.08)
GLOBULIN SER CALC-MCNC: 3.5 G/DL (ref 2–4)
GLUCOSE SERPL-MCNC: 106 MG/DL (ref 65–100)
HCT VFR BLD AUTO: 41.8 % (ref 36.6–50.3)
HGB BLD-MCNC: 13.9 G/DL (ref 12.1–17)
IMM GRANULOCYTES # BLD AUTO: 0 K/UL (ref 0–0.04)
IMM GRANULOCYTES NFR BLD AUTO: 1 % (ref 0–0.5)
LYMPHOCYTES # BLD: 1.3 K/UL (ref 0.8–3.5)
LYMPHOCYTES NFR BLD: 26 % (ref 12–49)
Lab: NORMAL
MAGNESIUM SERPL-MCNC: 2.1 MG/DL (ref 1.6–2.4)
MCH RBC QN AUTO: 30.3 PG (ref 26–34)
MCHC RBC AUTO-ENTMCNC: 33.3 G/DL (ref 30–36.5)
MCV RBC AUTO: 91.1 FL (ref 80–99)
METHADONE UR QL: NEGATIVE
MONOCYTES # BLD: 0.7 K/UL (ref 0–1)
MONOCYTES NFR BLD: 14 % (ref 5–13)
NEUTS SEG # BLD: 2.4 K/UL (ref 1.8–8)
NEUTS SEG NFR BLD: 50 % (ref 32–75)
NRBC # BLD: 0 K/UL (ref 0–0.01)
NRBC BLD-RTO: 0 PER 100 WBC
OPIATES UR QL: NEGATIVE
PCP UR QL: NEGATIVE
PLATELET # BLD AUTO: 249 K/UL (ref 150–400)
PMV BLD AUTO: 10 FL (ref 8.9–12.9)
POTASSIUM SERPL-SCNC: 4 MMOL/L (ref 3.5–5.1)
PROT SERPL-MCNC: 7.6 G/DL (ref 6.4–8.2)
RBC # BLD AUTO: 4.59 M/UL (ref 4.1–5.7)
SODIUM SERPL-SCNC: 138 MMOL/L (ref 136–145)
WBC # BLD AUTO: 4.8 K/UL (ref 4.1–11.1)

## 2023-07-28 PROCEDURE — 93005 ELECTROCARDIOGRAM TRACING: CPT | Performed by: EMERGENCY MEDICINE

## 2023-07-28 PROCEDURE — 80053 COMPREHEN METABOLIC PANEL: CPT

## 2023-07-28 PROCEDURE — 80307 DRUG TEST PRSMV CHEM ANLYZR: CPT

## 2023-07-28 PROCEDURE — 83735 ASSAY OF MAGNESIUM: CPT

## 2023-07-28 PROCEDURE — 85025 COMPLETE CBC W/AUTO DIFF WBC: CPT

## 2023-07-28 PROCEDURE — 96374 THER/PROPH/DIAG INJ IV PUSH: CPT

## 2023-07-28 PROCEDURE — 99284 EMERGENCY DEPT VISIT MOD MDM: CPT

## 2023-07-28 PROCEDURE — 2580000003 HC RX 258: Performed by: EMERGENCY MEDICINE

## 2023-07-28 PROCEDURE — 6360000002 HC RX W HCPCS: Performed by: EMERGENCY MEDICINE

## 2023-07-28 PROCEDURE — 82077 ASSAY SPEC XCP UR&BREATH IA: CPT

## 2023-07-28 PROCEDURE — 94760 N-INVAS EAR/PLS OXIMETRY 1: CPT

## 2023-07-28 PROCEDURE — 36415 COLL VENOUS BLD VENIPUNCTURE: CPT

## 2023-07-28 PROCEDURE — 80177 DRUG SCRN QUAN LEVETIRACETAM: CPT

## 2023-07-28 RX ORDER — LEVETIRACETAM 500 MG/5ML
500 INJECTION, SOLUTION, CONCENTRATE INTRAVENOUS ONCE
Status: COMPLETED | OUTPATIENT
Start: 2023-07-28 | End: 2023-07-28

## 2023-07-28 RX ORDER — 0.9 % SODIUM CHLORIDE 0.9 %
500 INTRAVENOUS SOLUTION INTRAVENOUS ONCE
Status: COMPLETED | OUTPATIENT
Start: 2023-07-28 | End: 2023-07-28

## 2023-07-28 RX ADMIN — SODIUM CHLORIDE 500 ML: 9 INJECTION, SOLUTION INTRAVENOUS at 21:51

## 2023-07-28 RX ADMIN — LEVETIRACETAM 500 MG: 100 INJECTION, SOLUTION INTRAVENOUS at 21:51

## 2023-07-28 ASSESSMENT — LIFESTYLE VARIABLES
HOW MANY STANDARD DRINKS CONTAINING ALCOHOL DO YOU HAVE ON A TYPICAL DAY: PATIENT DOES NOT DRINK
HOW OFTEN DO YOU HAVE A DRINK CONTAINING ALCOHOL: NEVER

## 2023-07-28 ASSESSMENT — PAIN - FUNCTIONAL ASSESSMENT: PAIN_FUNCTIONAL_ASSESSMENT: NONE - DENIES PAIN

## 2023-07-28 NOTE — ED NOTES
Friend of Mr. Jorge A Handley gave EMS contact number for family [phone].       Bette Dietz RN  07/28/23 1939

## 2023-07-28 NOTE — ED TRIAGE NOTES
Pt arrives via EMS for seizure. EMS reports the seizure was witness by a bystander. Pt has hx seizures and take Keppra at home.

## 2023-07-29 NOTE — DISCHARGE INSTRUCTIONS
Follow-up with your neurologist and primary care doctor. Keep your current dose of medication for now. Call the neurologist on Monday. Keep yourself well-hydrated. Return for any worsening or new events. Thank you for allowing us to provide you with excellent care today. We hope we addressed all of your concerns and needs. We strive to provide excellent quality care in the Emergency Department. Anything less than excellent does not meet our expectations for you. Incidental findings are findings on labs or imaging studies that do not necessarily correlate with the reason for your visit. We make every effort to inform you of these incidental findings and the proper follow-up, however it is important that you call your primary care doctor or a primary care doctor in 1 to 2 days to set up a follow-up visit so they can go through your results in detail with you, and determine if additional testing is needed. The emergency room is not intended to be complete or comprehensive care, and it serves as a means to rule out life-threatening pathologies. It is very important that you follow-up with your primary care doctor to arrange additional testing and/or treatment if needed. The exam and treatment you received in the Emergency Department were for an urgent problem and are not intended as complete care. It is important that you follow-up with a doctor, nurse practitioner, or physician assistant to:  (1) confirm your diagnosis,  (2) re-evaluation of changes in your illness and treatment, and  (3) for ongoing care. If your symptoms become worse or you do not improve as expected, or you develop any new symptoms that concern you and/or you are unable to reach your usual health care provider, you should return to the Emergency Department. We are available 24 hours a day.      If your blood pressure is greater than 120/80, your blood pressure is considered elevated above normal and you need to follow up with (mm/dd/yyyy) as indicated and click Submit. You will be taken to the next sign-up page. Create a In2G[employer] ID. This will be your In2Games login ID and cannot be changed, so think of one that is secure and easy to remember. Create a In2Games password. You can change your password at any time. Enter your Password Reset Question and Answer. This can be used at a later time if you forget your password. Enter your e-mail address. You will receive e-mail notification when new information is available in [address] [place] [address]. Click Sign Up. You can now view and download portions of your medical record. Click the VinPerfect link to download a portable copy of your medical information. Additional Information    If you have questions, please visit the Frequently Asked Questions section of the [employer] website at [email or web address] Remember, [employer] is NOT to be used for urgent needs. For medical emergencies, dial 911.

## 2023-07-31 LAB
EKG ATRIAL RATE: 72 BPM
EKG DIAGNOSIS: NORMAL
EKG P AXIS: 67 DEGREES
EKG P-R INTERVAL: 220 MS
EKG Q-T INTERVAL: 380 MS
EKG QRS DURATION: 102 MS
EKG QTC CALCULATION (BAZETT): 416 MS
EKG R AXIS: -17 DEGREES
EKG T AXIS: 57 DEGREES
EKG VENTRICULAR RATE: 72 BPM
LEVETIRACETAM SERPL-MCNC: <2 UG/ML (ref 10–40)

## 2023-08-21 ENCOUNTER — TELEPHONE (OUTPATIENT)
Age: 38
End: 2023-08-21

## 2023-08-21 NOTE — TELEPHONE ENCOUNTER
Patient's mother is concerned about her son's high lactate acid, he was taken to [facility]) last night for seizures. A CT & EKG was done there.      Pls call her  [phone]

## 2023-08-24 NOTE — TELEPHONE ENCOUNTER
That will happen after seizures.      Keep drinking fluids    If he missed medicaiton and had alcohol that was triggered by that

## 2023-12-25 DIAGNOSIS — G40.109 LOCALIZATION-RELATED (FOCAL) (PARTIAL) SYMPTOMATIC EPILEPSY AND EPILEPTIC SYNDROMES WITH SIMPLE PARTIAL SEIZURES, NOT INTRACTABLE, WITHOUT STATUS EPILEPTICUS (HCC): ICD-10-CM

## 2023-12-27 RX ORDER — LEVETIRACETAM 500 MG/1
TABLET, FILM COATED, EXTENDED RELEASE ORAL
Qty: 540 TABLET | Refills: 1 | Status: SHIPPED | OUTPATIENT
Start: 2023-12-27

## 2024-03-11 ENCOUNTER — TELEPHONE (OUTPATIENT)
Age: 39
End: 2024-03-11

## 2024-03-11 NOTE — TELEPHONE ENCOUNTER
Patients mother called and stated her son had 2 breakthrough seizures yesterday 03/10/24.  Seems to fine today.    She would like to know if there is anything she should do?      Next appointment 24    Patient will update PHI on next visit / current  on 23

## 2024-03-12 NOTE — TELEPHONE ENCOUNTER
High dose of Keppra, and Aptiom     Can add a LOW dose of something on top of these.   Or try to switch Aptiom like we tried last time to something else

## 2024-03-13 ENCOUNTER — TELEPHONE (OUTPATIENT)
Age: 39
End: 2024-03-13

## 2024-03-13 NOTE — TELEPHONE ENCOUNTER
Patient's mother is requesting a call back to discuss patient's seizures and additional medication. States that they do have MyCHart for future communication but would prefer a call back this time.

## 2024-03-15 DIAGNOSIS — G40.109 LOCALIZATION-RELATED (FOCAL) (PARTIAL) SYMPTOMATIC EPILEPSY AND EPILEPTIC SYNDROMES WITH SIMPLE PARTIAL SEIZURES, NOT INTRACTABLE, WITHOUT STATUS EPILEPTICUS (HCC): Primary | ICD-10-CM

## 2024-03-15 NOTE — TELEPHONE ENCOUNTER
Spoke with patient's mother,Mireya. She stated that the Patient had two seizures Sunday back to back.  Mireya stated that she's not sure why he had the seizures.  She stated he might have had a few beers when he was playing pool but she doesn't know for sure.   Mireya would like you to send something to add for the patient's seizures. [facility] [place].

## 2024-04-24 ENCOUNTER — OFFICE VISIT (OUTPATIENT)
Age: 39
End: 2024-04-24
Payer: MEDICARE

## 2024-04-24 VITALS
TEMPERATURE: 97.8 F | DIASTOLIC BLOOD PRESSURE: 72 MMHG | OXYGEN SATURATION: 100 % | HEART RATE: 80 BPM | SYSTOLIC BLOOD PRESSURE: 120 MMHG

## 2024-04-24 DIAGNOSIS — G40.109 LOCALIZATION-RELATED (FOCAL) (PARTIAL) SYMPTOMATIC EPILEPSY AND EPILEPTIC SYNDROMES WITH SIMPLE PARTIAL SEIZURES, NOT INTRACTABLE, WITHOUT STATUS EPILEPTICUS (HCC): Primary | ICD-10-CM

## 2024-04-24 PROCEDURE — G8427 DOCREV CUR MEDS BY ELIG CLIN: HCPCS | Performed by: NURSE PRACTITIONER

## 2024-04-24 PROCEDURE — G8420 CALC BMI NORM PARAMETERS: HCPCS | Performed by: NURSE PRACTITIONER

## 2024-04-24 PROCEDURE — 99215 OFFICE O/P EST HI 40 MIN: CPT | Performed by: NURSE PRACTITIONER

## 2024-04-24 PROCEDURE — 1036F TOBACCO NON-USER: CPT | Performed by: NURSE PRACTITIONER

## 2024-04-24 NOTE — PROGRESS NOTES
tailored for this  examination and automatic exposure control for dose modulation.    FINDINGS:  The ventricles and sulci are normal in size, shape and configuration. There is  no significant white matter disease. There is no intracranial hemorrhage,  extra-axial collection, or mass effect. The basilar cisterns are open. No CT  evidence of acute infarct.    The bone windows demonstrate no abnormalities. The visualized portions of the  paranasal sinuses and mastoid air cells are clear.    Impression  No acute abnormality.      EEG Result:      Carotid Doppler:        Recent Labs:  Lab Results   Component Value Date    WBC 4.8 07/28/2023    HGB 13.9 07/28/2023    HCT 41.8 07/28/2023    MCV 91.1 07/28/2023     07/28/2023     Lab Results   Component Value Date     07/28/2023    K 4.0 07/28/2023     07/28/2023    CO2 29 07/28/2023    BUN 10 07/28/2023    CREATININE 1.00 07/28/2023    GLUCOSE 106 (H) 07/28/2023    CALCIUM 9.1 07/28/2023    PROT 7.6 07/28/2023    BILITOT 0.3 07/28/2023    ALKPHOS 97 07/28/2023    AST 28 07/28/2023    ALT 51 07/28/2023    LABGLOM >60 07/28/2023    GFRAA >60 05/02/2022    AGRATIO 1.2 07/28/2023    GLOB 3.5 07/28/2023       Lab Results   Component Value Date    CHOL 189 10/12/2021     Lab Results   Component Value Date    TRIG 125 10/12/2021     Lab Results   Component Value Date    HDL 49 10/12/2021     Lab Results   Component Value Date    LDLCALC 118 (H) 10/12/2021     Lab Results   Component Value Date    VLDL 22 10/12/2021     No results found for: \"CHOLHDLRATIO\"    No results found for: \"SEDRATE\"    Hemoglobin A1C   Date Value Ref Range Status   10/12/2021 5.2 4.8 - 5.6 % Final     Comment:              Prediabetes: 5.7 - 6.4           Diabetes: >6.4           Glycemic control for adults with diabetes: <7.0       No results found for: \"RUSSELL\"             1. Localization-related (focal) (partial) symptomatic epilepsy and epileptic syndromes with simple partial seizures,

## 2024-06-06 ENCOUNTER — HOSPITAL ENCOUNTER (EMERGENCY)
Facility: HOSPITAL | Age: 39
Discharge: HOME | End: 2024-06-07
Attending: STUDENT IN AN ORGANIZED HEALTH CARE EDUCATION/TRAINING PROGRAM
Payer: MEDICARE

## 2024-06-06 ENCOUNTER — APPOINTMENT (OUTPATIENT)
Facility: HOSPITAL | Age: 39
End: 2024-06-06
Payer: MEDICARE

## 2024-06-06 DIAGNOSIS — R56.9 SEIZURE (HCC): ICD-10-CM

## 2024-06-06 DIAGNOSIS — E87.1 HYPONATREMIA: ICD-10-CM

## 2024-06-06 DIAGNOSIS — S09.90XA INJURY OF HEAD, INITIAL ENCOUNTER: Primary | ICD-10-CM

## 2024-06-06 DIAGNOSIS — S01.01XA LACERATION OF SCALP, INITIAL ENCOUNTER: ICD-10-CM

## 2024-06-06 LAB
ALBUMIN SERPL-MCNC: 3.7 G/DL (ref 3.5–5)
ALBUMIN/GLOB SERPL: 1.2 (ref 1.1–2.2)
ALP SERPL-CCNC: 68 U/L (ref 45–117)
ALT SERPL-CCNC: 29 U/L (ref 12–78)
AMPHET UR QL SCN: NEGATIVE
ANION GAP SERPL CALC-SCNC: 5 MMOL/L (ref 5–15)
APPEARANCE UR: CLEAR
AST SERPL-CCNC: 27 U/L (ref 15–37)
BACTERIA URNS QL MICRO: NEGATIVE /HPF
BARBITURATES UR QL SCN: NEGATIVE
BASOPHILS # BLD: 0.1 K/UL (ref 0–0.1)
BASOPHILS NFR BLD: 1 % (ref 0–1)
BENZODIAZ UR QL: NEGATIVE
BILIRUB SERPL-MCNC: 0.5 MG/DL (ref 0.2–1)
BILIRUB UR QL: NEGATIVE
BUN SERPL-MCNC: 5 MG/DL (ref 6–20)
BUN/CREAT SERPL: 5 (ref 12–20)
CALCIUM SERPL-MCNC: 8.2 MG/DL (ref 8.5–10.1)
CANNABINOIDS UR QL SCN: NEGATIVE
CHLORIDE SERPL-SCNC: 106 MMOL/L (ref 97–108)
CO2 SERPL-SCNC: 21 MMOL/L (ref 21–32)
COCAINE UR QL SCN: NEGATIVE
COLOR UR: NORMAL
CREAT SERPL-MCNC: 0.91 MG/DL (ref 0.7–1.3)
DIFFERENTIAL METHOD BLD: NORMAL
EOSINOPHIL # BLD: 0.3 K/UL (ref 0–0.4)
EOSINOPHIL NFR BLD: 6 % (ref 0–7)
EPITH CASTS URNS QL MICRO: NORMAL /LPF
ERYTHROCYTE [DISTWIDTH] IN BLOOD BY AUTOMATED COUNT: 11.9 % (ref 11.5–14.5)
GLOBULIN SER CALC-MCNC: 3 G/DL (ref 2–4)
GLUCOSE BLD STRIP.AUTO-MCNC: 112 MG/DL (ref 65–117)
GLUCOSE SERPL-MCNC: 113 MG/DL (ref 65–100)
GLUCOSE UR STRIP.AUTO-MCNC: NEGATIVE MG/DL
HCT VFR BLD AUTO: 42.6 % (ref 36.6–50.3)
HGB BLD-MCNC: 14.7 G/DL (ref 12.1–17)
HGB UR QL STRIP: NEGATIVE
HYALINE CASTS URNS QL MICRO: NORMAL /LPF (ref 0–2)
IMM GRANULOCYTES # BLD AUTO: 0 K/UL (ref 0–0.04)
IMM GRANULOCYTES NFR BLD AUTO: 0 % (ref 0–0.5)
KETONES UR QL STRIP.AUTO: NEGATIVE MG/DL
LEUKOCYTE ESTERASE UR QL STRIP.AUTO: NEGATIVE
LYMPHOCYTES # BLD: 1.5 K/UL (ref 0.8–3.5)
LYMPHOCYTES NFR BLD: 30 % (ref 12–49)
Lab: NORMAL
MCH RBC QN AUTO: 30.5 PG (ref 26–34)
MCHC RBC AUTO-ENTMCNC: 34.5 G/DL (ref 30–36.5)
MCV RBC AUTO: 88.4 FL (ref 80–99)
METHADONE UR QL: NEGATIVE
MONOCYTES # BLD: 0.6 K/UL (ref 0–1)
MONOCYTES NFR BLD: 11 % (ref 5–13)
NEUTS SEG # BLD: 2.5 K/UL (ref 1.8–8)
NEUTS SEG NFR BLD: 52 % (ref 32–75)
NITRITE UR QL STRIP.AUTO: NEGATIVE
NRBC # BLD: 0 K/UL (ref 0–0.01)
NRBC BLD-RTO: 0 PER 100 WBC
OPIATES UR QL: NEGATIVE
PCP UR QL: NEGATIVE
PH UR STRIP: 7 (ref 5–8)
PLATELET # BLD AUTO: 185 K/UL (ref 150–400)
PMV BLD AUTO: 10.6 FL (ref 8.9–12.9)
POTASSIUM SERPL-SCNC: 4.5 MMOL/L (ref 3.5–5.1)
PROT SERPL-MCNC: 6.7 G/DL (ref 6.4–8.2)
PROT UR STRIP-MCNC: NEGATIVE MG/DL
RBC # BLD AUTO: 4.82 M/UL (ref 4.1–5.7)
RBC #/AREA URNS HPF: NORMAL /HPF (ref 0–5)
SERVICE CMNT-IMP: NORMAL
SODIUM SERPL-SCNC: 132 MMOL/L (ref 136–145)
SP GR UR REFRACTOMETRY: 1.01
URINE CULTURE IF INDICATED: NORMAL
UROBILINOGEN UR QL STRIP.AUTO: 0.2 EU/DL (ref 0.2–1)
WBC # BLD AUTO: 4.9 K/UL (ref 4.1–11.1)
WBC URNS QL MICRO: NORMAL /HPF (ref 0–4)

## 2024-06-06 PROCEDURE — 85025 COMPLETE CBC W/AUTO DIFF WBC: CPT

## 2024-06-06 PROCEDURE — 81001 URINALYSIS AUTO W/SCOPE: CPT

## 2024-06-06 PROCEDURE — 96365 THER/PROPH/DIAG IV INF INIT: CPT

## 2024-06-06 PROCEDURE — 90471 IMMUNIZATION ADMIN: CPT | Performed by: STUDENT IN AN ORGANIZED HEALTH CARE EDUCATION/TRAINING PROGRAM

## 2024-06-06 PROCEDURE — 82962 GLUCOSE BLOOD TEST: CPT

## 2024-06-06 PROCEDURE — 36415 COLL VENOUS BLD VENIPUNCTURE: CPT

## 2024-06-06 PROCEDURE — 72125 CT NECK SPINE W/O DYE: CPT

## 2024-06-06 PROCEDURE — 80053 COMPREHEN METABOLIC PANEL: CPT

## 2024-06-06 PROCEDURE — 6360000002 HC RX W HCPCS: Performed by: STUDENT IN AN ORGANIZED HEALTH CARE EDUCATION/TRAINING PROGRAM

## 2024-06-06 PROCEDURE — 70450 CT HEAD/BRAIN W/O DYE: CPT

## 2024-06-06 PROCEDURE — 12001 RPR S/N/AX/GEN/TRNK 2.5CM/<: CPT

## 2024-06-06 PROCEDURE — 90715 TDAP VACCINE 7 YRS/> IM: CPT | Performed by: STUDENT IN AN ORGANIZED HEALTH CARE EDUCATION/TRAINING PROGRAM

## 2024-06-06 PROCEDURE — 99284 EMERGENCY DEPT VISIT MOD MDM: CPT

## 2024-06-06 PROCEDURE — 2580000003 HC RX 258: Performed by: STUDENT IN AN ORGANIZED HEALTH CARE EDUCATION/TRAINING PROGRAM

## 2024-06-06 PROCEDURE — 80307 DRUG TEST PRSMV CHEM ANLYZR: CPT

## 2024-06-06 RX ADMIN — TETANUS TOXOID, REDUCED DIPHTHERIA TOXOID AND ACELLULAR PERTUSSIS VACCINE, ADSORBED 0.5 ML: 5; 2.5; 8; 8; 2.5 SUSPENSION INTRAMUSCULAR at 22:41

## 2024-06-06 RX ADMIN — LEVETIRACETAM 3000 MG: 500 INJECTION, SOLUTION, CONCENTRATE INTRAVENOUS at 22:10

## 2024-06-06 ASSESSMENT — PAIN SCALES - GENERAL: PAINLEVEL_OUTOF10: 5

## 2024-06-06 ASSESSMENT — PAIN DESCRIPTION - LOCATION: LOCATION: HEAD

## 2024-06-07 ENCOUNTER — TELEPHONE (OUTPATIENT)
Age: 39
End: 2024-06-07

## 2024-06-07 VITALS
RESPIRATION RATE: 12 BRPM | DIASTOLIC BLOOD PRESSURE: 81 MMHG | TEMPERATURE: 97.8 F | SYSTOLIC BLOOD PRESSURE: 122 MMHG | OXYGEN SATURATION: 99 % | HEART RATE: 60 BPM

## 2024-06-07 LAB
EKG ATRIAL RATE: 83 BPM
EKG DIAGNOSIS: NORMAL
EKG Q-T INTERVAL: 390 MS
EKG QRS DURATION: 98 MS
EKG QTC CALCULATION (BAZETT): 441 MS
EKG R AXIS: -16 DEGREES
EKG T AXIS: 34 DEGREES
EKG VENTRICULAR RATE: 77 BPM

## 2024-06-07 PROCEDURE — 6370000000 HC RX 637 (ALT 250 FOR IP): Performed by: STUDENT IN AN ORGANIZED HEALTH CARE EDUCATION/TRAINING PROGRAM

## 2024-06-07 PROCEDURE — 12001 RPR S/N/AX/GEN/TRNK 2.5CM/<: CPT

## 2024-06-07 PROCEDURE — 93005 ELECTROCARDIOGRAM TRACING: CPT

## 2024-06-07 RX ORDER — GINSENG 100 MG
CAPSULE ORAL ONCE
Status: COMPLETED | OUTPATIENT
Start: 2024-06-07 | End: 2024-06-07

## 2024-06-07 RX ADMIN — BACITRACIN 1 EACH: 500 OINTMENT TOPICAL at 00:49

## 2024-06-07 NOTE — ED NOTES
Pt discharged, not in distress, ambulatory, accompanied by mother.   Claimed the little knife from security on his way out.

## 2024-06-07 NOTE — ED PROVIDER NOTES
[facility] EMERGENCY DEPT  EMERGENCY DEPARTMENT ENCOUNTER       Pt Name: Perry Shaffer  MRN: [id number]  Birthdate 1985  Date of evaluation: 6/6/2024  Provider: Jose De Jesus Trimble MD   PCP: None, None  Note Started: 11:57 AM EDT 6/7/24     CHIEF COMPLAINT       Chief Complaint   Patient presents with   • Fall     Pt came in via EMS with cc of fall, he was picked up from [place] and seen by a group of people fell and hit his head. (+) LOC.  BS 109mg/dl per EMS, pt wasn't talking to EMS en route to hospital.         HISTORY OF PRESENT ILLNESS: 1 or more elements      History From: EMS report, family, History limited by: AMS     Perry Shaffer is a 38 y.o. male w hx of epilepsy presents to the ED with AMS.  He was witnessed by group of people to have a fall where he hit the back of his head.  No reports of convulsions.  He did have a loss of consciousness.  With EMS he was not talking on way to the hospital.    On initial exam he is intermittently responding to commands and seems confused.  He is not able to provide any history.    Reached out to mother to obtain additional history and she reports that he has seizures relatively regularly with last 1-2 months ago.  She reports that he is postictal with AMS/confusion for 1-2 hours following seizures and this is often tired for a whole day.  No recent medication changes.  They live together in [place].  He does not always take his medications, and she does not monitor this as he is an adult.  She denies any alcohol or drug use.    Please See MDM for Additional Details of the HPI/PMH  Nursing Notes were all reviewed and agreed with or any disagreements were addressed in the HPI.     REVIEW OF SYSTEMS        Positives and Pertinent negatives as per HPI.    PAST HISTORY     Past Medical History:  Past Medical History:   Diagnosis Date   • Neurological disorder    • Seizures (HCC)        Past Surgical History:  No past surgical history on file.    Family History:  No

## 2024-06-07 NOTE — TELEPHONE ENCOUNTER
Patient mother calling to let CHRIST Choi know that her son went to the ER on 6/6/24 at [facility] and had a seizure and cut his head and had to have some staples .    She also want to discuss some concerns that doctor at the ER had.    Please call to discuss.

## 2024-06-07 NOTE — DISCHARGE INSTRUCTIONS
Please make a followup with your primary care physician and neurologist.  If you have any new or worsening concerning medical symptoms please return to the emergency department.    Given your history it is most likely that your fall and loss of consciousness was from a seizure.  It is important to call your neurologist tomorrow.    Your staples need to be removed in 7-14 days.  Please make sure to change a dressing and use vaseline once per day.  Please return to the emergency department if you have signs of redness, swelling, pain, or drainage at the site of your wound.

## 2024-06-07 NOTE — ED NOTES
The patient handed me his personal cell phone to speak with his mother, Soni. Update provided, all questions answered at this time. Pod 1 phone number provided with an invite to call us any time. I verified Soni's phone number with her, and the patient's sister's (Trina). Soni lives with the patient in [place] but is unable to come to the ED [facility].

## 2024-06-11 NOTE — TELEPHONE ENCOUNTER
Looks to be just to be dehydrated     Is he taking his medication?   Because I am never sure with him and they did not draw a level of Keppra.       He needs to either go to the EMU or we need to add another medication like Depakote

## 2024-06-15 ENCOUNTER — HOSPITAL ENCOUNTER (EMERGENCY)
Facility: HOSPITAL | Age: 39
Discharge: HOME | End: 2024-06-15
Payer: MEDICARE

## 2024-06-15 VITALS
TEMPERATURE: 97.9 F | OXYGEN SATURATION: 98 % | SYSTOLIC BLOOD PRESSURE: 117 MMHG | BODY MASS INDEX: 21.17 KG/M2 | WEIGHT: 127.21 LBS | DIASTOLIC BLOOD PRESSURE: 83 MMHG | HEART RATE: 62 BPM | RESPIRATION RATE: 14 BRPM

## 2024-06-15 DIAGNOSIS — Z48.02 ENCOUNTER FOR STAPLE REMOVAL: Primary | ICD-10-CM

## 2024-06-15 PROCEDURE — 99282 EMERGENCY DEPT VISIT SF MDM: CPT

## 2024-06-15 ASSESSMENT — PAIN SCALES - GENERAL: PAINLEVEL_OUTOF10: 0

## 2024-06-15 NOTE — ED PROVIDER NOTES
[facility] EMERGENCY DEPT  EMERGENCY DEPARTMENT ENCOUNTER         Pt Name: Perry Shaffer  MRN: [id number]  Birthdate 1985  Date of evaluation: 6/15/2024  Provider: Shobha Rivera PA-C   PCP: None, None  Note Started: 4:42 PM EDT 6/15/24     CHIEF COMPLAINT       Chief Complaint   Patient presents with    Staple Removal     Here to have staples removed. Denies pain or drainage. Reports he has been keeping it clean        HISTORY OF PRESENT ILLNESS: 1 or more elements      History From: Patient  HPI Limitations: None     Perry Shaffer is a 38 y.o. male who presents ambulatory to the emergency department for staple removal.  Patient states that he had staples placed approximately 1 week ago, denies any pain at the staple site, denies any drainage from the area.  Patient reports he has been keeping clean and has not had any trouble.     Nursing Notes were all reviewed and agreed with or any disagreements were addressed in the HPI.  Please see MDM for additional details of HPI and ROS     REVIEW OF SYSTEMS      Review of Systems     Positives and Pertinent negatives as per HPI.    PAST HISTORY     Past Medical History:  Past Medical History:   Diagnosis Date    Neurological disorder     Seizures (HCC)        Past Surgical History:  No past surgical history on file.    Family History:  No family history on file.    Social History:  Social History     Tobacco Use    Smoking status: Former     Types: Cigarettes     Passive exposure: Past    Smokeless tobacco: Never   Substance Use Topics    Alcohol use: Yes    Drug use: No       Allergies:  Allergies   Allergen Reactions    Penicillins Rash       CURRENT MEDICATIONS      Previous Medications    DIAZEPAM, 20 MG DOSE, (VALTOCO 20 MG DOSE) 2 X 10 MG/0.1ML LQPK    1 spray by Nasal route daily as needed    ESLICARBAZEPINE ACETATE (APTIOM) 800 MG TABLET    TAKE 2 TABLETS BY MOUTH EVERY DAY IN THE MORNING    LEVETIRACETAM (KEPPRA XR) 500 MG TB24 EXTENDED RELEASE TABLET     pt tolerated well.     EMERGENCY DEPARTMENT COURSE and DIFFERENTIAL DIAGNOSIS/MDM   Vitals:    Vitals:    06/15/24 1617   BP: 117/83   Pulse: 62   Resp: 14   Temp: 97.9 °F (36.6 °C)   TempSrc: Oral   SpO2: 98%   Weight: 57.7 kg (127 lb 3.3 oz)        Patient was given the following medications:  Medications - No data to display    CONSULTS: (Who and What was discussed)  None    Chronic Conditions: seizures    Social Determinants affecting Dx or Tx: None    Records Reviewed (source and summary of external records): Nursing Notes and Old Medical Records    MDM: CC/HPI Summary, DDx, ED Course, and Reassessment, Disposition Considerations (Tests not done, Shared Decision Making, Pt Expectation of Test or Tx.):     Patient presents for suture removal. The wound is well healed without signs of infection.  The sutures are removed. Wound care and activity instructions given. Return precautions discussed, discharged.          FINAL IMPRESSION     1. Encounter for staple removal          DISPOSITION/PLAN   DISPOSITION Decision To Discharge 06/15/2024 04:41:59 PM        Care plan outlined and precautions discussed.  Patient has no new complaints, changes, or physical findings.  All of pt's questions and concerns were addressed. Patient was instructed and agrees to follow up with PCP, as well as to return to the ED upon further deterioration. Patient is ready to go home.      PATIENT REFERRED TO:  [facility] EMERGENCY DEPT  [address] [facility] [address] [phone]    If symptoms worsen    [facility]  [address] [place] [address] [phone]  Schedule an appointment as soon as possible for a visit          DISCHARGE MEDICATIONS:     Medication List        ASK your doctor about these medications      eslicarbazepine acetate 800 MG tablet  Commonly known as: Aptiom  TAKE 2 TABLETS BY MOUTH EVERY DAY IN THE MORNING     levETIRAcetam 500 MG Tb24 extended release tablet  Commonly

## 2024-06-30 DIAGNOSIS — G40.109 LOCALIZATION-RELATED (FOCAL) (PARTIAL) SYMPTOMATIC EPILEPSY AND EPILEPTIC SYNDROMES WITH SIMPLE PARTIAL SEIZURES, NOT INTRACTABLE, WITHOUT STATUS EPILEPTICUS (HCC): ICD-10-CM

## 2024-07-01 RX ORDER — LEVETIRACETAM 500 MG/1
TABLET, FILM COATED, EXTENDED RELEASE ORAL
Qty: 540 TABLET | Refills: 1 | Status: SHIPPED | OUTPATIENT
Start: 2024-07-01

## 2024-10-23 ENCOUNTER — OFFICE VISIT (OUTPATIENT)
Age: 39
End: 2024-10-23
Payer: MEDICARE

## 2024-10-23 VITALS
TEMPERATURE: 97.7 F | SYSTOLIC BLOOD PRESSURE: 120 MMHG | OXYGEN SATURATION: 99 % | RESPIRATION RATE: 18 BRPM | HEART RATE: 60 BPM | DIASTOLIC BLOOD PRESSURE: 74 MMHG

## 2024-10-23 DIAGNOSIS — G40.109 LOCALIZATION-RELATED (FOCAL) (PARTIAL) SYMPTOMATIC EPILEPSY AND EPILEPTIC SYNDROMES WITH SIMPLE PARTIAL SEIZURES, NOT INTRACTABLE, WITHOUT STATUS EPILEPTICUS (HCC): Primary | ICD-10-CM

## 2024-10-23 PROCEDURE — G8427 DOCREV CUR MEDS BY ELIG CLIN: HCPCS | Performed by: NURSE PRACTITIONER

## 2024-10-23 PROCEDURE — G8484 FLU IMMUNIZE NO ADMIN: HCPCS | Performed by: NURSE PRACTITIONER

## 2024-10-23 PROCEDURE — 99214 OFFICE O/P EST MOD 30 MIN: CPT | Performed by: NURSE PRACTITIONER

## 2024-10-23 PROCEDURE — 1036F TOBACCO NON-USER: CPT | Performed by: NURSE PRACTITIONER

## 2024-10-23 PROCEDURE — G8420 CALC BMI NORM PARAMETERS: HCPCS | Performed by: NURSE PRACTITIONER

## 2024-10-23 NOTE — PROGRESS NOTES
Perry Shaffer is a 38 y.o. male who presents with the following  Chief Complaint   Patient presents with    Seizures     Patient's mom is with him and states he had 1 seizure in June and 1 last month.        HPI       Patient comes in for follow-up with his mother  He is on Aptiom 1600  He is on Keppra 3000 mg daily  He never switched to any other medications  He does not want to change or increase.   He feels things are going well.      He has had a total of 3-4 breakthrough seizures since last visit  He does have the nasal spray if he needs it but did not use it.      He is doing odd jobs like painting  He is get outside and walking  He is being physically active  He does live with his mother in [place]    He states compliance with medications   He feels like no changes.     Allergies   Allergen Reactions    Penicillins Rash       Current Outpatient Medications   Medication Sig Dispense Refill    levETIRAcetam (KEPPRA XR) 500 MG TB24 extended release tablet TAKE 6 TABLETS BY MOUTH NIGHTLY 540 tablet 1    eslicarbazepine acetate (APTIOM) 800 MG tablet TAKE 2 TABLETS BY MOUTH EVERY DAY IN THE MORNING 180 tablet 2    Midazolam (NAYZILAM) 5 MG/0.1ML SOLN 1 spray in both nostrils at seizure onset. May repeat X 1 dose in 15 minutes 2 each 4    diazePAM, 20 MG Dose, (VALTOCO 20 MG DOSE) 2 x 10 MG/0.1ML LQPK 1 spray by Nasal route daily as needed       No current facility-administered medications for this visit.        Social History     Tobacco Use   Smoking Status Former    Types: Cigarettes    Passive exposure: Past   Smokeless Tobacco Never       Past Medical History:   Diagnosis Date    Neurological disorder     Seizures (HCC)        No past surgical history on file.    No family history on file.    Social History     Socioeconomic History    Marital status: Single   Tobacco Use    Smoking status: Former     Types: Cigarettes     Passive exposure: Past    Smokeless tobacco: Never   Substance and Sexual

## 2024-11-13 ENCOUNTER — HOSPITAL ENCOUNTER (OUTPATIENT)
Facility: HOSPITAL | Age: 39
Discharge: HOME | End: 2024-11-16
Payer: MEDICARE

## 2024-11-13 DIAGNOSIS — G40.109 LOCALIZATION-RELATED (FOCAL) (PARTIAL) SYMPTOMATIC EPILEPSY AND EPILEPTIC SYNDROMES WITH SIMPLE PARTIAL SEIZURES, NOT INTRACTABLE, WITHOUT STATUS EPILEPTICUS (HCC): ICD-10-CM

## 2024-11-13 PROCEDURE — 95816 EEG AWAKE AND DROWSY: CPT

## 2024-11-13 NOTE — PROCEDURES
EEG REPORT    Patient Name: Perry Shaffer  : 1985  Age: 38 y.o.    Ordering physician: Cricket Soto Aprn - Np  [address]  [address]  [place] [address]  Date of EEG start time: 2024 at 9:04 AM  Date of EEG end time: 2024 at 9:51 AM  EEG procedure number: [id number]  Diagnosis: Seizure  Interpreting physician: Aurea Eller D.O.      Procedure: EEG    CLINICAL INDICATION: The patient is a 38 y.o. male who is being evaluated for baseline electro cerebral activities and to rule out seizure focus.      Current Outpatient Medications   Medication Sig    levETIRAcetam (KEPPRA XR) 500 MG TB24 extended release tablet TAKE 6 TABLETS BY MOUTH NIGHTLY    eslicarbazepine acetate (APTIOM) 800 MG tablet TAKE 2 TABLETS BY MOUTH EVERY DAY IN THE MORNING    Midazolam (NAYZILAM) 5 MG/0.1ML SOLN 1 spray in both nostrils at seizure onset. May repeat X 1 dose in 15 minutes    diazePAM, 20 MG Dose, (VALTOCO 20 MG DOSE) 2 x 10 MG/0.1ML LQPK 1 spray by Nasal route daily as needed     No current facility-administered medications for this encounter.           DESCRIPTION OF PROCEDURE:   This is a digitally recorded electroencephalogram.  Electrodes were applied in accordance with the international 10-20 system of electrode placement.    18 channels of scalp EEG are recorded.  A channel was used for EoG.  Another channel was used for ECG.   The data is stored digitally and reviewed in reformatted montages for optimal display.  EEG was reviewed in both bipolar and referential montages.    Description of Activity:  The background of this recording contains mixed frequencies in the delta, theta, alpha range. The posterior dominant rhythm reaches 9 hz and is seen symmetrically in both hemispheres.  Amplitudes are symmetric in both hemispheres with normal voltages.  The anterior-posterior gradient is well-organized.  There is variability, continuity, reactivity present.    Throughout the

## 2024-11-26 DIAGNOSIS — G40.109 LOCALIZATION-RELATED (FOCAL) (PARTIAL) SYMPTOMATIC EPILEPSY AND EPILEPTIC SYNDROMES WITH SIMPLE PARTIAL SEIZURES, NOT INTRACTABLE, WITHOUT STATUS EPILEPTICUS (HCC): ICD-10-CM

## 2024-11-30 RX ORDER — LEVETIRACETAM 500 MG/1
TABLET, FILM COATED, EXTENDED RELEASE ORAL
Qty: 540 TABLET | Refills: 1 | Status: SHIPPED | OUTPATIENT
Start: 2024-11-30

## 2025-01-16 DIAGNOSIS — G40.109 LOCALIZATION-RELATED (FOCAL) (PARTIAL) SYMPTOMATIC EPILEPSY AND EPILEPTIC SYNDROMES WITH SIMPLE PARTIAL SEIZURES, NOT INTRACTABLE, WITHOUT STATUS EPILEPTICUS (HCC): ICD-10-CM

## 2025-04-13 ENCOUNTER — HOSPITAL ENCOUNTER (EMERGENCY)
Facility: HOSPITAL | Age: 40
Discharge: HOME | End: 2025-04-14
Attending: EMERGENCY MEDICINE
Payer: MEDICARE

## 2025-04-13 DIAGNOSIS — G40.919 BREAKTHROUGH SEIZURE (HCC): Primary | ICD-10-CM

## 2025-04-13 LAB
ALBUMIN SERPL-MCNC: 4.1 G/DL (ref 3.5–5)
ALBUMIN/GLOB SERPL: 1.5 (ref 1.1–2.2)
ALP SERPL-CCNC: 75 U/L (ref 45–117)
ALT SERPL-CCNC: 34 U/L (ref 12–78)
ANION GAP SERPL CALC-SCNC: 10 MMOL/L (ref 2–12)
AST SERPL W P-5'-P-CCNC: 29 U/L (ref 15–37)
BASOPHILS # BLD: 0.05 K/UL (ref 0–0.1)
BASOPHILS NFR BLD: 0.8 % (ref 0–1)
BILIRUB SERPL-MCNC: 0.2 MG/DL (ref 0.2–1)
BUN SERPL-MCNC: 11 MG/DL (ref 6–20)
BUN/CREAT SERPL: 9 (ref 12–20)
CA-I BLD-MCNC: 9.2 MG/DL (ref 8.5–10.1)
CHLORIDE SERPL-SCNC: 98 MMOL/L (ref 97–108)
CO2 SERPL-SCNC: 24 MMOL/L (ref 21–32)
CREAT SERPL-MCNC: 1.18 MG/DL (ref 0.7–1.3)
DIFFERENTIAL METHOD BLD: ABNORMAL
EOSINOPHIL # BLD: 0.38 K/UL (ref 0–0.4)
EOSINOPHIL NFR BLD: 5.7 % (ref 0–7)
ERYTHROCYTE [DISTWIDTH] IN BLOOD BY AUTOMATED COUNT: 11.9 % (ref 11.5–14.5)
GLOBULIN SER CALC-MCNC: 2.8 G/DL (ref 2–4)
GLUCOSE SERPL-MCNC: 112 MG/DL (ref 65–100)
HCT VFR BLD AUTO: 39.1 % (ref 36.6–50.3)
HGB BLD-MCNC: 13.6 G/DL (ref 12.1–17)
IMM GRANULOCYTES # BLD AUTO: 0.01 K/UL (ref 0–0.04)
IMM GRANULOCYTES NFR BLD AUTO: 0.2 % (ref 0–0.5)
LYMPHOCYTES # BLD: 2.08 K/UL (ref 0.8–3.5)
LYMPHOCYTES NFR BLD: 31.2 % (ref 12–49)
MCH RBC QN AUTO: 29.8 PG (ref 26–34)
MCHC RBC AUTO-ENTMCNC: 34.8 G/DL (ref 30–36.5)
MCV RBC AUTO: 85.6 FL (ref 80–99)
MONOCYTES # BLD: 0.9 K/UL (ref 0–1)
MONOCYTES NFR BLD: 13.5 % (ref 5–13)
NEUTS SEG # BLD: 3.24 K/UL (ref 1.8–8)
NEUTS SEG NFR BLD: 48.6 % (ref 32–75)
NRBC # BLD: 0 K/UL (ref 0–0.01)
NRBC BLD-RTO: 0 PER 100 WBC
PLATELET # BLD AUTO: 206 K/UL (ref 150–400)
PMV BLD AUTO: 10.6 FL (ref 8.9–12.9)
POTASSIUM SERPL-SCNC: 3.8 MMOL/L (ref 3.5–5.1)
PROT SERPL-MCNC: 6.9 G/DL (ref 6.4–8.2)
RBC # BLD AUTO: 4.57 M/UL (ref 4.1–5.7)
SODIUM SERPL-SCNC: 132 MMOL/L (ref 136–145)
WBC # BLD AUTO: 6.7 K/UL (ref 4.1–11.1)

## 2025-04-13 PROCEDURE — 85025 COMPLETE CBC W/AUTO DIFF WBC: CPT

## 2025-04-13 PROCEDURE — 96374 THER/PROPH/DIAG INJ IV PUSH: CPT

## 2025-04-13 PROCEDURE — 99284 EMERGENCY DEPT VISIT MOD MDM: CPT

## 2025-04-13 PROCEDURE — 6360000002 HC RX W HCPCS: Performed by: EMERGENCY MEDICINE

## 2025-04-13 PROCEDURE — 80053 COMPREHEN METABOLIC PANEL: CPT

## 2025-04-13 PROCEDURE — 36415 COLL VENOUS BLD VENIPUNCTURE: CPT

## 2025-04-13 RX ORDER — LEVETIRACETAM 500 MG/5ML
1000 INJECTION, SOLUTION, CONCENTRATE INTRAVENOUS
Status: COMPLETED | OUTPATIENT
Start: 2025-04-13 | End: 2025-04-13

## 2025-04-13 RX ADMIN — LEVETIRACETAM 1000 MG: 100 INJECTION INTRAVENOUS at 22:36

## 2025-04-13 ASSESSMENT — PAIN - FUNCTIONAL ASSESSMENT: PAIN_FUNCTIONAL_ASSESSMENT: NONE - DENIES PAIN

## 2025-04-14 VITALS
HEIGHT: 65 IN | WEIGHT: 127 LBS | TEMPERATURE: 97.9 F | DIASTOLIC BLOOD PRESSURE: 66 MMHG | RESPIRATION RATE: 15 BRPM | BODY MASS INDEX: 21.16 KG/M2 | OXYGEN SATURATION: 97 % | HEART RATE: 73 BPM | SYSTOLIC BLOOD PRESSURE: 114 MMHG

## 2025-04-14 ASSESSMENT — PAIN - FUNCTIONAL ASSESSMENT: PAIN_FUNCTIONAL_ASSESSMENT: NONE - DENIES PAIN

## 2025-04-14 NOTE — ED PROVIDER NOTES
[facility] EMERGENCY DEPT  EMERGENCY DEPARTMENT HISTORY AND PHYSICAL EXAM      Date of evaluation: 4/13/2025  Patient Name: Perry Shaffer  Birthdate 1985  MRN: [id number]  ED Provider: Annalisa Moeller MD   Note Started: 10:24 PM EDT 4/13/25    HISTORY OF PRESENT ILLNESS     Chief Complaint   Patient presents with    Seizures       History Provided By: Patient, EMS     HPI: Perry Shaffer is a 39 y.o. male with history of seizures on Keppra presenting after seizure episode.  Patient staying at a friend's house who witnessed the seizure and called EMS.  Denies any nausea, vomiting, diarrhea, cough, shortness of breath history limited by patient as he is currently postictal.    PAST MEDICAL HISTORY   Past Medical History:  Past Medical History:   Diagnosis Date    Neurological disorder     Seizures (HCC)        Past Surgical History:  History reviewed. No pertinent surgical history.    Family History:  History reviewed. No pertinent family history.    Social History:  Social History     Tobacco Use    Smoking status: Former     Types: Cigarettes     Passive exposure: Past    Smokeless tobacco: Never   Substance Use Topics    Alcohol use: Yes    Drug use: No       Allergies:  Allergies   Allergen Reactions    Penicillins Rash       PCP: None, None    Current Meds:   No current facility-administered medications for this encounter.     Current Outpatient Medications   Medication Sig Dispense Refill    eslicarbazepine acetate (APTIOM) 800 MG tablet TAKE 2 TABLETS BY MOUTH EVERY MORNING 180 tablet 2    levETIRAcetam (KEPPRA XR) 500 MG TB24 extended release tablet TAKE 6 TABLETS BY MOUTH NIGHTLY 540 tablet 1    Midazolam (NAYZILAM) 5 MG/0.1ML SOLN 1 spray in both nostrils at seizure onset. May repeat X 1 dose in 15 minutes 2 each 4    diazePAM, 20 MG Dose, (VALTOCO 20 MG DOSE) 2 x 10 MG/0.1ML LQPK 1 spray by Nasal route daily as needed         Social Determinants of Health:   Social Drivers of Health     Tobacco Use:

## 2025-04-14 NOTE — DISCHARGE INSTRUCTIONS
(L) 12 - 20      Est, Glom Filt Rate 80 >60 ml/min/1.73m2    Calcium 9.2 8.5 - 10.1 mg/dL    Total Bilirubin 0.2 0.2 - 1.0 mg/dL    AST 29 15 - 37 U/L    ALT 34 12 - 78 U/L    Alk Phosphatase 75 45 - 117 U/L    Total Protein 6.9 6.4 - 8.2 g/dL    Albumin 4.1 3.5 - 5.0 g/dL    Globulin 2.8 2.0 - 4.0 g/dL    Albumin/Globulin Ratio 1.5 1.1 - 2.2       No results found.  ------------------------------------------------------------------------------------------------------------  The evaluation and treatment you received in the Emergency Department were for an urgent problem. It is important that you follow-up with a doctor, nurse practitioner, or physician assistant to:  (1) confirm your diagnosis,  (2) re-evaluation of changes in your illness and treatment, and (3) for ongoing care. Please take your discharge instructions with you when you go to your follow-up appointment.     If you have any problem arranging a follow-up appointment, contact us!  If your symptoms become worse or you do not improve as expected, please return to us. We are available 24 hours a day.     If a prescription has been provided, please fill it as soon as possible to prevent a delay in treatment. If you have any questions or reservations about taking the medication due to side effects or interactions with other medications, please call your primary care provider or contact us directly.  Again, THANK YOU for choosing us to care for YOU!

## 2025-04-14 NOTE — ED TRIAGE NOTES
Pt presents with EMS s/p witnessed seizure lasting estimated 2 min.  Pt mother states his last one was last June. Pt is visiting a friend out of town.  Pt is a&o, denies non complaince of seizure medication.

## 2025-05-14 ENCOUNTER — TELEPHONE (OUTPATIENT)
Age: 40
End: 2025-05-14

## 2025-05-14 NOTE — TELEPHONE ENCOUNTER
Is requesting a PA on pt's medication APTIOM. States pt is completely out and needs ASAP.    Please advise.

## 2025-05-15 NOTE — TELEPHONE ENCOUNTER
The PA for the requested medication was submitted 5/14/25 awaiting response from insurance company

## 2025-05-16 NOTE — TELEPHONE ENCOUNTER
RE:Aptiom  Key: [id number]    Requested medication has been approved     Approved on May 15 by [facility] 2017  PA Case: [id number], Status: Approved, Coverage Starts on: 5/15/2025 12:21:34 PM, Coverage Ends on: 5/15/2025 12:21:34 PM. Questions? Contact [phone].  Effective Date: 5/15/2025  Authorization Expiration Date: 5/15/2025

## 2025-05-17 DIAGNOSIS — G40.109 LOCALIZATION-RELATED (FOCAL) (PARTIAL) SYMPTOMATIC EPILEPSY AND EPILEPTIC SYNDROMES WITH SIMPLE PARTIAL SEIZURES, NOT INTRACTABLE, WITHOUT STATUS EPILEPTICUS (HCC): ICD-10-CM

## 2025-05-19 RX ORDER — ESLICARBAZEPINE ACETATE 800 MG/1
1600 TABLET ORAL EVERY MORNING
Qty: 180 TABLET | Refills: 0 | Status: SHIPPED | OUTPATIENT
Start: 2025-05-19

## 2025-05-21 ENCOUNTER — TELEPHONE (OUTPATIENT)
Age: 40
End: 2025-05-21

## 2025-05-21 NOTE — TELEPHONE ENCOUNTER
Patient mother requesting a call to schedule a sooner follow up appt for medications before 5/2026

## 2025-05-22 DIAGNOSIS — G40.109 LOCALIZATION-RELATED (FOCAL) (PARTIAL) SYMPTOMATIC EPILEPSY AND EPILEPTIC SYNDROMES WITH SIMPLE PARTIAL SEIZURES, NOT INTRACTABLE, WITHOUT STATUS EPILEPTICUS (HCC): ICD-10-CM

## 2025-05-22 RX ORDER — LEVETIRACETAM 500 MG/1
TABLET, FILM COATED, EXTENDED RELEASE ORAL
Qty: 540 TABLET | Refills: 1 | Status: SHIPPED | OUTPATIENT
Start: 2025-05-22

## 2025-09-06 DIAGNOSIS — G40.109 LOCALIZATION-RELATED (FOCAL) (PARTIAL) SYMPTOMATIC EPILEPSY AND EPILEPTIC SYNDROMES WITH SIMPLE PARTIAL SEIZURES, NOT INTRACTABLE, WITHOUT STATUS EPILEPTICUS (HCC): ICD-10-CM

## 2025-09-07 RX ORDER — ESLICARBAZEPINE ACETATE 800 MG/1
1600 TABLET ORAL EVERY MORNING
Qty: 180 TABLET | Refills: 1 | Status: SHIPPED | OUTPATIENT
Start: 2025-09-07